# Patient Record
Sex: FEMALE | Race: WHITE
[De-identification: names, ages, dates, MRNs, and addresses within clinical notes are randomized per-mention and may not be internally consistent; named-entity substitution may affect disease eponyms.]

---

## 2019-11-17 ENCOUNTER — HOSPITAL ENCOUNTER (INPATIENT)
Dept: HOSPITAL 8 - ED | Age: 74
LOS: 1 days | Discharge: HOME | DRG: 309 | End: 2019-11-18
Attending: FAMILY MEDICINE | Admitting: INTERNAL MEDICINE
Payer: MEDICARE

## 2019-11-17 VITALS — WEIGHT: 241.85 LBS | HEIGHT: 60 IN | BODY MASS INDEX: 47.48 KG/M2

## 2019-11-17 VITALS — SYSTOLIC BLOOD PRESSURE: 115 MMHG | DIASTOLIC BLOOD PRESSURE: 89 MMHG

## 2019-11-17 DIAGNOSIS — Z82.49: ICD-10-CM

## 2019-11-17 DIAGNOSIS — R79.89: ICD-10-CM

## 2019-11-17 DIAGNOSIS — Z87.891: ICD-10-CM

## 2019-11-17 DIAGNOSIS — I24.8: ICD-10-CM

## 2019-11-17 DIAGNOSIS — I47.1: Primary | ICD-10-CM

## 2019-11-17 DIAGNOSIS — E66.01: ICD-10-CM

## 2019-11-17 DIAGNOSIS — I35.8: ICD-10-CM

## 2019-11-17 DIAGNOSIS — I95.9: ICD-10-CM

## 2019-11-17 DIAGNOSIS — Z90.710: ICD-10-CM

## 2019-11-17 LAB
ALBUMIN SERPL-MCNC: 3 G/DL (ref 3.4–5)
ALP SERPL-CCNC: 76 U/L (ref 45–117)
ALT SERPL-CCNC: 30 U/L (ref 12–78)
ANION GAP SERPL CALC-SCNC: 5 MMOL/L (ref 5–15)
BASOPHILS # BLD AUTO: 0.02 X10^3/UL (ref 0–0.1)
BASOPHILS NFR BLD AUTO: 0 % (ref 0–1)
BILIRUB SERPL-MCNC: 0.2 MG/DL (ref 0.2–1)
CALCIUM SERPL-MCNC: 8.4 MG/DL (ref 8.5–10.1)
CHLORIDE SERPL-SCNC: 111 MMOL/L (ref 98–107)
CREAT SERPL-MCNC: 0.84 MG/DL (ref 0.55–1.02)
EOSINOPHIL # BLD AUTO: 0.08 X10^3/UL (ref 0–0.4)
EOSINOPHIL NFR BLD AUTO: 1 % (ref 1–7)
ERYTHROCYTE [DISTWIDTH] IN BLOOD BY AUTOMATED COUNT: 14.1 % (ref 9.6–15.2)
INR PPP: 1.04 (ref 0.93–1.1)
LYMPHOCYTES # BLD AUTO: 1.18 X10^3/UL (ref 1–3.4)
LYMPHOCYTES NFR BLD AUTO: 16 % (ref 22–44)
MCH RBC QN AUTO: 30.4 PG (ref 27–34.8)
MCHC RBC AUTO-ENTMCNC: 32.7 G/DL (ref 32.4–35.8)
MCV RBC AUTO: 93.1 FL (ref 80–100)
MD: NO
MONOCYTES # BLD AUTO: 0.5 X10^3/UL (ref 0.2–0.8)
MONOCYTES NFR BLD AUTO: 7 % (ref 2–9)
NEUTROPHILS # BLD AUTO: 5.83 X10^3/UL (ref 1.8–6.8)
NEUTROPHILS NFR BLD AUTO: 77 % (ref 42–75)
PLATELET # BLD AUTO: 260 X10^3/UL (ref 130–400)
PMV BLD AUTO: 7.9 FL (ref 7.4–10.4)
PROT SERPL-MCNC: 7 G/DL (ref 6.4–8.2)
PROTHROMBIN TIME: 10.9 SECONDS (ref 9.6–11.5)
RBC # BLD AUTO: 4.95 X10^6/UL (ref 3.82–5.3)
T4 FREE SERPL-MCNC: 0.76 NG/DL (ref 0.76–1.46)
TROPONIN I SERPL-MCNC: 0.06 NG/ML (ref 0–0.04)

## 2019-11-17 PROCEDURE — 80048 BASIC METABOLIC PNL TOTAL CA: CPT

## 2019-11-17 PROCEDURE — 83605 ASSAY OF LACTIC ACID: CPT

## 2019-11-17 PROCEDURE — 96361 HYDRATE IV INFUSION ADD-ON: CPT

## 2019-11-17 PROCEDURE — 84484 ASSAY OF TROPONIN QUANT: CPT

## 2019-11-17 PROCEDURE — 83036 HEMOGLOBIN GLYCOSYLATED A1C: CPT

## 2019-11-17 PROCEDURE — 80053 COMPREHEN METABOLIC PANEL: CPT

## 2019-11-17 PROCEDURE — 83880 ASSAY OF NATRIURETIC PEPTIDE: CPT

## 2019-11-17 PROCEDURE — 83735 ASSAY OF MAGNESIUM: CPT

## 2019-11-17 PROCEDURE — 36415 COLL VENOUS BLD VENIPUNCTURE: CPT

## 2019-11-17 PROCEDURE — 85610 PROTHROMBIN TIME: CPT

## 2019-11-17 PROCEDURE — 96374 THER/PROPH/DIAG INJ IV PUSH: CPT

## 2019-11-17 PROCEDURE — 93005 ELECTROCARDIOGRAM TRACING: CPT

## 2019-11-17 PROCEDURE — 84443 ASSAY THYROID STIM HORMONE: CPT

## 2019-11-17 PROCEDURE — 93306 TTE W/DOPPLER COMPLETE: CPT

## 2019-11-17 PROCEDURE — 85025 COMPLETE CBC W/AUTO DIFF WBC: CPT

## 2019-11-17 PROCEDURE — 99291 CRITICAL CARE FIRST HOUR: CPT

## 2019-11-17 PROCEDURE — 84439 ASSAY OF FREE THYROXINE: CPT

## 2019-11-17 PROCEDURE — 84100 ASSAY OF PHOSPHORUS: CPT

## 2019-11-17 PROCEDURE — 71045 X-RAY EXAM CHEST 1 VIEW: CPT

## 2019-11-17 NOTE — NUR
BIB [facility] WITH C/O INTERMITTENT LIGHTHEADED AND DIZZINESS STARTING THIS 
AFTERNOON. EN ROUT PT RECEIVED 325 CHEWED ASA, VERSED, ZOFRAN, ADENOSING X2. 
UNSUCESSFUL CONVERSION FROM SVT WITH ADENOSINE NOR CARDIOVERSION. PT PRESENTED 
TO ED WITH SYMPTOMATIC SVT C/O LIGHTHEADEDNESS WITH ASSOCIATED CHEST PAIN.

## 2019-11-18 VITALS — SYSTOLIC BLOOD PRESSURE: 154 MMHG | DIASTOLIC BLOOD PRESSURE: 71 MMHG

## 2019-11-18 VITALS — SYSTOLIC BLOOD PRESSURE: 149 MMHG | DIASTOLIC BLOOD PRESSURE: 77 MMHG

## 2019-11-18 VITALS — DIASTOLIC BLOOD PRESSURE: 73 MMHG | SYSTOLIC BLOOD PRESSURE: 149 MMHG

## 2019-11-18 LAB
ANION GAP SERPL CALC-SCNC: 3 MMOL/L (ref 5–15)
BASOPHILS # BLD AUTO: 0.11 X10^3/UL (ref 0–0.1)
BASOPHILS NFR BLD AUTO: 1 % (ref 0–1)
CALCIUM SERPL-MCNC: 9.1 MG/DL (ref 8.5–10.1)
CHLORIDE SERPL-SCNC: 112 MMOL/L (ref 98–107)
CREAT SERPL-MCNC: 0.85 MG/DL (ref 0.55–1.02)
EOSINOPHIL # BLD AUTO: 0.25 X10^3/UL (ref 0–0.4)
EOSINOPHIL NFR BLD AUTO: 3 % (ref 1–7)
ERYTHROCYTE [DISTWIDTH] IN BLOOD BY AUTOMATED COUNT: 14.3 % (ref 9.6–15.2)
EST. AVERAGE GLUCOSE BLD GHB EST-MCNC: 111 MG/DL (ref 0–126)
HBA1C MFR BLD: 5.5 % (ref 4.2–6.3)
LYMPHOCYTES # BLD AUTO: 2.51 X10^3/UL (ref 1–3.4)
LYMPHOCYTES NFR BLD AUTO: 29 % (ref 22–44)
MCH RBC QN AUTO: 30.3 PG (ref 27–34.8)
MCHC RBC AUTO-ENTMCNC: 32.3 G/DL (ref 32.4–35.8)
MCV RBC AUTO: 93.7 FL (ref 80–100)
MD: NO
MONOCYTES # BLD AUTO: 0.73 X10^3/UL (ref 0.2–0.8)
MONOCYTES NFR BLD AUTO: 8 % (ref 2–9)
NEUTROPHILS # BLD AUTO: 5.19 X10^3/UL (ref 1.8–6.8)
NEUTROPHILS NFR BLD AUTO: 59 % (ref 42–75)
PLATELET # BLD AUTO: 270 X10^3/UL (ref 130–400)
PMV BLD AUTO: 8.4 FL (ref 7.4–10.4)
RBC # BLD AUTO: 4.94 X10^6/UL (ref 3.82–5.3)
TROPONIN I SERPL-MCNC: 0.33 NG/ML (ref 0–0.04)
TROPONIN I SERPL-MCNC: 0.37 NG/ML (ref 0–0.04)

## 2019-11-18 RX ADMIN — HEPARIN SODIUM SCH UNITS: 5000 INJECTION, SOLUTION INTRAVENOUS; SUBCUTANEOUS at 16:00

## 2019-11-18 RX ADMIN — HEPARIN SODIUM SCH UNITS: 5000 INJECTION, SOLUTION INTRAVENOUS; SUBCUTANEOUS at 09:04

## 2019-11-18 RX ADMIN — HEPARIN SODIUM SCH UNITS: 5000 INJECTION, SOLUTION INTRAVENOUS; SUBCUTANEOUS at 00:05

## 2019-11-19 ENCOUNTER — HOSPITAL ENCOUNTER (INPATIENT)
Dept: HOSPITAL 8 - ED | Age: 74
LOS: 4 days | Discharge: HOME | DRG: 287 | End: 2019-11-23
Attending: INTERNAL MEDICINE | Admitting: INTERNAL MEDICINE
Payer: MEDICARE

## 2019-11-19 VITALS — SYSTOLIC BLOOD PRESSURE: 119 MMHG | DIASTOLIC BLOOD PRESSURE: 78 MMHG

## 2019-11-19 VITALS — BODY MASS INDEX: 45.88 KG/M2 | HEIGHT: 60 IN | WEIGHT: 233.69 LBS

## 2019-11-19 VITALS — DIASTOLIC BLOOD PRESSURE: 78 MMHG | SYSTOLIC BLOOD PRESSURE: 119 MMHG

## 2019-11-19 DIAGNOSIS — Z82.49: ICD-10-CM

## 2019-11-19 DIAGNOSIS — I48.0: ICD-10-CM

## 2019-11-19 DIAGNOSIS — E66.01: ICD-10-CM

## 2019-11-19 DIAGNOSIS — I08.3: ICD-10-CM

## 2019-11-19 DIAGNOSIS — M19.90: ICD-10-CM

## 2019-11-19 DIAGNOSIS — Z90.710: ICD-10-CM

## 2019-11-19 DIAGNOSIS — D68.69: ICD-10-CM

## 2019-11-19 DIAGNOSIS — Z87.891: ICD-10-CM

## 2019-11-19 DIAGNOSIS — I25.82: ICD-10-CM

## 2019-11-19 DIAGNOSIS — I25.10: ICD-10-CM

## 2019-11-19 DIAGNOSIS — I47.1: ICD-10-CM

## 2019-11-19 DIAGNOSIS — I49.3: ICD-10-CM

## 2019-11-19 DIAGNOSIS — L03.115: ICD-10-CM

## 2019-11-19 DIAGNOSIS — E78.5: ICD-10-CM

## 2019-11-19 DIAGNOSIS — Z66: ICD-10-CM

## 2019-11-19 DIAGNOSIS — Z90.49: ICD-10-CM

## 2019-11-19 DIAGNOSIS — I11.9: ICD-10-CM

## 2019-11-19 DIAGNOSIS — I47.2: Primary | ICD-10-CM

## 2019-11-19 LAB
ALBUMIN SERPL-MCNC: 3.3 G/DL (ref 3.4–5)
ALP SERPL-CCNC: 80 U/L (ref 45–117)
ALT SERPL-CCNC: 32 U/L (ref 12–78)
ANION GAP SERPL CALC-SCNC: 7 MMOL/L (ref 5–15)
BASOPHILS # BLD AUTO: 0.03 X10^3/UL (ref 0–0.1)
BASOPHILS NFR BLD AUTO: 0 % (ref 0–1)
BILIRUB SERPL-MCNC: 0.3 MG/DL (ref 0.2–1)
CALCIUM SERPL-MCNC: 9.4 MG/DL (ref 8.5–10.1)
CHLORIDE SERPL-SCNC: 109 MMOL/L (ref 98–107)
CREAT SERPL-MCNC: 1.45 MG/DL (ref 0.55–1.02)
EOSINOPHIL # BLD AUTO: 0.16 X10^3/UL (ref 0–0.4)
EOSINOPHIL NFR BLD AUTO: 2 % (ref 1–7)
ERYTHROCYTE [DISTWIDTH] IN BLOOD BY AUTOMATED COUNT: 13.9 % (ref 9.6–15.2)
LYMPHOCYTES # BLD AUTO: 2.2 X10^3/UL (ref 1–3.4)
LYMPHOCYTES NFR BLD AUTO: 24 % (ref 22–44)
MCH RBC QN AUTO: 30.5 PG (ref 27–34.8)
MCHC RBC AUTO-ENTMCNC: 33.5 G/DL (ref 32.4–35.8)
MCV RBC AUTO: 91.1 FL (ref 80–100)
MD: NO
MONOCYTES # BLD AUTO: 0.73 X10^3/UL (ref 0.2–0.8)
MONOCYTES NFR BLD AUTO: 8 % (ref 2–9)
NEUTROPHILS # BLD AUTO: 6.23 X10^3/UL (ref 1.8–6.8)
NEUTROPHILS NFR BLD AUTO: 67 % (ref 42–75)
PLATELET # BLD AUTO: 270 X10^3/UL (ref 130–400)
PMV BLD AUTO: 7.9 FL (ref 7.4–10.4)
PROT SERPL-MCNC: 7.6 G/DL (ref 6.4–8.2)
RBC # BLD AUTO: 5.06 X10^6/UL (ref 3.82–5.3)
TROPONIN I SERPL-MCNC: 0.03 NG/ML (ref 0–0.04)

## 2019-11-19 PROCEDURE — C1769 GUIDE WIRE: HCPCS

## 2019-11-19 PROCEDURE — 80053 COMPREHEN METABOLIC PANEL: CPT

## 2019-11-19 PROCEDURE — 85025 COMPLETE CBC W/AUTO DIFF WBC: CPT

## 2019-11-19 PROCEDURE — 99156 MOD SED OTH PHYS/QHP 5/>YRS: CPT

## 2019-11-19 PROCEDURE — 87040 BLOOD CULTURE FOR BACTERIA: CPT

## 2019-11-19 PROCEDURE — 84100 ASSAY OF PHOSPHORUS: CPT

## 2019-11-19 PROCEDURE — C1894 INTRO/SHEATH, NON-LASER: HCPCS

## 2019-11-19 PROCEDURE — 93005 ELECTROCARDIOGRAM TRACING: CPT

## 2019-11-19 PROCEDURE — 83735 ASSAY OF MAGNESIUM: CPT

## 2019-11-19 PROCEDURE — C1887 CATHETER, GUIDING: HCPCS

## 2019-11-19 PROCEDURE — 99157 MOD SED OTHER PHYS/QHP EA: CPT

## 2019-11-19 PROCEDURE — 99291 CRITICAL CARE FIRST HOUR: CPT

## 2019-11-19 PROCEDURE — 36415 COLL VENOUS BLD VENIPUNCTURE: CPT

## 2019-11-19 PROCEDURE — 71045 X-RAY EXAM CHEST 1 VIEW: CPT

## 2019-11-19 PROCEDURE — 80048 BASIC METABOLIC PNL TOTAL CA: CPT

## 2019-11-19 PROCEDURE — 93571 IV DOP VEL&/PRESS C FLO 1ST: CPT

## 2019-11-19 PROCEDURE — 80061 LIPID PANEL: CPT

## 2019-11-19 PROCEDURE — 93458 L HRT ARTERY/VENTRICLE ANGIO: CPT

## 2019-11-19 PROCEDURE — 84484 ASSAY OF TROPONIN QUANT: CPT

## 2019-11-19 RX ADMIN — SODIUM CHLORIDE, PRESERVATIVE FREE SCH ML: 5 INJECTION INTRAVENOUS at 23:05

## 2019-11-19 RX ADMIN — Medication PRN EACH: at 20:30

## 2019-11-19 RX ADMIN — HEPARIN SODIUM SCH UNITS: 5000 INJECTION, SOLUTION INTRAVENOUS; SUBCUTANEOUS at 23:05

## 2019-11-19 NOTE — NUR
CALLED PHARMACY AND REQUESTED AMIO DRIP.  PT. CONTINUES TO HAVE 6 SECOND RUNS 
OF V-TACH INTERMITTENTLY.  PT. C/O "EXTREME DIZZINESS AND BURNING CP" DURING 
THESE EVENTS.  EKG'S BEING DONE TO CAPTURE EVENTS.  PADS HAVE BEEN IN PLACE 
SINCE GETTING INTO ROOM.  O2 PLACED FOR RA SAT OF 89%.  DR. HUTTON IN TO 
DISCUSS POC WITH PT.

## 2019-11-19 NOTE — NUR
PT. DENIES ANY SOB, DIZZINESS, OR OTHER COMPLAINTS DURING THESE EVENTS.  ALL 
MONITORS ARE IN PLACE.  LABS DONE, 2 IV'S IN PLACE.  X-RAY DONE.  TECH AT  
FOR REPEATE EKG.  EKG WAS DONE IN [facility] IMMEDIATELY ON ARRIVAL.  PT. REPORTS 
TAKING 324 CHEWABLE ASPIRIN PRIOR TO COMING IN TONIGHT.

## 2019-11-19 NOTE — NUR
2ND LOADING DOSE INFUSING NOW PER ORDER.  TECH AT  FOR EKG.  PT. REPORTS HAS 
BEEN FEELIGN MUCH BETTER SINCE ADMIN OF FIRST LOADING DOSE.  PT. DENIES ANY 
PAIN/DISCOMFORT AT THIS TIME.  ALL MONTIORS AND SAFETY MEASURES REMAIN IN 
PLACE.

## 2019-11-19 NOTE — NUR
DR. HUTTON AT BS AT THIS TIME.  PT. REMAINS IN SVT ON MONITOR RATE 200'S TO 
220'S.  AWATING MED FROM PHARMACY.

## 2019-11-19 NOTE — NUR
RUFINO LEE STARTED PER MAR.  PT. REMAINS WITH 'S 'S AND CONTINUES TO 
C/O DIZZINESS AND PAIN.  DR. HUTTON CONSULTING WITH CARDS NOW.

## 2019-11-19 NOTE — NUR
PT. TO ED [facility] WITH C/O SUDDEN ONSET STERNAL CP DESCRIBED AS BURING 6/10 
WHILE SITTING DOWN.  PT. DENIES CP AT THIS TIME AND STATES "IT ONLY COMES ON 
WHEN MY HEART IS RACING"  PT. HAD A RECENT EVENT OF SVT AND REQUIRED ADENOSINE 
AND CARDIOVERSION.  PT. DENIES ANY DAILY MEDICATIONS.  UPON ENTERING ROOM PT. 
HAD FREQUENT RUNS OF V-TACH AND RATE 'S.  DURING THIS NOTE PT. HAD ANOTER 
EVENT OF HR IN 'S WIDE COMPLEX AND STATES "HERE'S THAT PAIN AGAIN".  
LASTED ABOUT 6 SECONDS AND WENT AWAY; HR BACK DOWN  AT THIS TIME.

## 2019-11-19 NOTE — NUR
PT. REPORTS PAIN IS NOW GONE.  HR NOW 90'S AND APPEARS NSR ON MONITOR.  DR. HUTTON DISCUSSING POC WITH PT. NOW.  PER CARDIOLOGY IN 10 MIN WE WILL GIVE 
ANOTHER LOADING DOSE OF 150ML'S OVER 10 MIN AND THEN START THE AMIO DRIP.  

-------------------------------------------------------------------------------

Addendum: 11/19/19 at 2107 by LEONA

-------------------------------------------------------------------------------

150MG'S FOR LOADING DOSE; NOT 150ML'S.

## 2019-11-20 VITALS — DIASTOLIC BLOOD PRESSURE: 77 MMHG | SYSTOLIC BLOOD PRESSURE: 127 MMHG

## 2019-11-20 VITALS — DIASTOLIC BLOOD PRESSURE: 74 MMHG | SYSTOLIC BLOOD PRESSURE: 129 MMHG

## 2019-11-20 VITALS — SYSTOLIC BLOOD PRESSURE: 138 MMHG | DIASTOLIC BLOOD PRESSURE: 87 MMHG

## 2019-11-20 VITALS — DIASTOLIC BLOOD PRESSURE: 78 MMHG | SYSTOLIC BLOOD PRESSURE: 145 MMHG

## 2019-11-20 VITALS — SYSTOLIC BLOOD PRESSURE: 160 MMHG | DIASTOLIC BLOOD PRESSURE: 90 MMHG

## 2019-11-20 LAB
ALBUMIN SERPL-MCNC: 3.3 G/DL (ref 3.4–5)
ALP SERPL-CCNC: 75 U/L (ref 45–117)
ALT SERPL-CCNC: 27 U/L (ref 12–78)
ANION GAP SERPL CALC-SCNC: 5 MMOL/L (ref 5–15)
BASOPHILS # BLD AUTO: 0.06 X10^3/UL (ref 0–0.1)
BASOPHILS NFR BLD AUTO: 1 % (ref 0–1)
BILIRUB SERPL-MCNC: 0.4 MG/DL (ref 0.2–1)
CALCIUM SERPL-MCNC: 9.2 MG/DL (ref 8.5–10.1)
CHLORIDE SERPL-SCNC: 109 MMOL/L (ref 98–107)
CREAT SERPL-MCNC: 0.99 MG/DL (ref 0.55–1.02)
EOSINOPHIL # BLD AUTO: 0.24 X10^3/UL (ref 0–0.4)
EOSINOPHIL NFR BLD AUTO: 3 % (ref 1–7)
ERYTHROCYTE [DISTWIDTH] IN BLOOD BY AUTOMATED COUNT: 13.8 % (ref 9.6–15.2)
LYMPHOCYTES # BLD AUTO: 2.67 X10^3/UL (ref 1–3.4)
LYMPHOCYTES NFR BLD AUTO: 30 % (ref 22–44)
MCH RBC QN AUTO: 30.9 PG (ref 27–34.8)
MCHC RBC AUTO-ENTMCNC: 33.6 G/DL (ref 32.4–35.8)
MCV RBC AUTO: 91.8 FL (ref 80–100)
MD: NO
MONOCYTES # BLD AUTO: 0.75 X10^3/UL (ref 0.2–0.8)
MONOCYTES NFR BLD AUTO: 8 % (ref 2–9)
NEUTROPHILS # BLD AUTO: 5.17 X10^3/UL (ref 1.8–6.8)
NEUTROPHILS NFR BLD AUTO: 58 % (ref 42–75)
PLATELET # BLD AUTO: 266 X10^3/UL (ref 130–400)
PMV BLD AUTO: 8.1 FL (ref 7.4–10.4)
PROT SERPL-MCNC: 7.2 G/DL (ref 6.4–8.2)
RBC # BLD AUTO: 4.83 X10^6/UL (ref 3.82–5.3)
TROPONIN I SERPL-MCNC: 0.08 NG/ML (ref 0–0.04)
TROPONIN I SERPL-MCNC: 0.18 NG/ML (ref 0–0.04)

## 2019-11-20 RX ADMIN — HEPARIN SODIUM SCH UNITS: 5000 INJECTION, SOLUTION INTRAVENOUS; SUBCUTANEOUS at 06:06

## 2019-11-20 RX ADMIN — SODIUM CHLORIDE, PRESERVATIVE FREE SCH ML: 5 INJECTION INTRAVENOUS at 21:00

## 2019-11-20 RX ADMIN — SODIUM CHLORIDE, PRESERVATIVE FREE SCH ML: 5 INJECTION INTRAVENOUS at 09:42

## 2019-11-20 RX ADMIN — Medication PRN EACH: at 19:45

## 2019-11-20 RX ADMIN — HEPARIN SODIUM SCH UNITS: 5000 INJECTION, SOLUTION INTRAVENOUS; SUBCUTANEOUS at 22:16

## 2019-11-20 RX ADMIN — DOCUSATE SODIUM 50MG AND SENNOSIDES 8.6MG SCH TAB: 8.6; 5 TABLET, FILM COATED ORAL at 09:00

## 2019-11-20 RX ADMIN — HEPARIN SODIUM SCH UNITS: 5000 INJECTION, SOLUTION INTRAVENOUS; SUBCUTANEOUS at 13:33

## 2019-11-21 VITALS — DIASTOLIC BLOOD PRESSURE: 85 MMHG | SYSTOLIC BLOOD PRESSURE: 181 MMHG

## 2019-11-21 VITALS — DIASTOLIC BLOOD PRESSURE: 75 MMHG | SYSTOLIC BLOOD PRESSURE: 130 MMHG

## 2019-11-21 VITALS — DIASTOLIC BLOOD PRESSURE: 99 MMHG | SYSTOLIC BLOOD PRESSURE: 155 MMHG

## 2019-11-21 VITALS — SYSTOLIC BLOOD PRESSURE: 145 MMHG | DIASTOLIC BLOOD PRESSURE: 84 MMHG

## 2019-11-21 VITALS — SYSTOLIC BLOOD PRESSURE: 167 MMHG | DIASTOLIC BLOOD PRESSURE: 83 MMHG

## 2019-11-21 LAB
CHOL/HDL RATIO: 4.7
HDL CHOL %: 21 % (ref 28–40)
HDL CHOLESTEROL (DIRECT): 38 MG/DL (ref 40–60)
LDL CHOLESTEROL,CALCULATED: 114 MG/DL (ref 54–169)
LDLC/HDLC SERPL: 3 {RATIO} (ref 0.5–3)
TRIGL SERPL-MCNC: 131 MG/DL (ref 50–200)
VLDLC SERPL CALC-MCNC: 26 MG/DL (ref 0–25)

## 2019-11-21 PROCEDURE — B2151ZZ FLUOROSCOPY OF LEFT HEART USING LOW OSMOLAR CONTRAST: ICD-10-PCS | Performed by: INTERNAL MEDICINE

## 2019-11-21 PROCEDURE — B2111ZZ FLUOROSCOPY OF MULTIPLE CORONARY ARTERIES USING LOW OSMOLAR CONTRAST: ICD-10-PCS | Performed by: INTERNAL MEDICINE

## 2019-11-21 PROCEDURE — 4A023N7 MEASUREMENT OF CARDIAC SAMPLING AND PRESSURE, LEFT HEART, PERCUTANEOUS APPROACH: ICD-10-PCS | Performed by: INTERNAL MEDICINE

## 2019-11-21 PROCEDURE — 4A033BC MEASUREMENT OF ARTERIAL PRESSURE, CORONARY, PERCUTANEOUS APPROACH: ICD-10-PCS | Performed by: INTERNAL MEDICINE

## 2019-11-21 RX ADMIN — HEPARIN SODIUM SCH UNITS: 5000 INJECTION, SOLUTION INTRAVENOUS; SUBCUTANEOUS at 22:00

## 2019-11-21 RX ADMIN — HEPARIN SODIUM SCH UNITS: 5000 INJECTION, SOLUTION INTRAVENOUS; SUBCUTANEOUS at 14:00

## 2019-11-21 RX ADMIN — SODIUM CHLORIDE, PRESERVATIVE FREE SCH ML: 5 INJECTION INTRAVENOUS at 21:00

## 2019-11-21 RX ADMIN — HEPARIN SODIUM SCH UNITS: 5000 INJECTION, SOLUTION INTRAVENOUS; SUBCUTANEOUS at 05:53

## 2019-11-21 RX ADMIN — DOCUSATE SODIUM 50MG AND SENNOSIDES 8.6MG SCH TAB: 8.6; 5 TABLET, FILM COATED ORAL at 07:51

## 2019-11-21 RX ADMIN — CEFTRIAXONE SCH MLS/HR: 1 INJECTION, SOLUTION INTRAVENOUS at 09:50

## 2019-11-21 RX ADMIN — ATORVASTATIN CALCIUM SCH MG: 20 TABLET, FILM COATED ORAL at 21:00

## 2019-11-21 RX ADMIN — SODIUM CHLORIDE, PRESERVATIVE FREE SCH ML: 5 INJECTION INTRAVENOUS at 07:52

## 2019-11-22 VITALS — SYSTOLIC BLOOD PRESSURE: 123 MMHG | DIASTOLIC BLOOD PRESSURE: 73 MMHG

## 2019-11-22 VITALS — SYSTOLIC BLOOD PRESSURE: 135 MMHG | DIASTOLIC BLOOD PRESSURE: 68 MMHG

## 2019-11-22 VITALS — SYSTOLIC BLOOD PRESSURE: 143 MMHG | DIASTOLIC BLOOD PRESSURE: 75 MMHG

## 2019-11-22 VITALS — SYSTOLIC BLOOD PRESSURE: 116 MMHG | DIASTOLIC BLOOD PRESSURE: 70 MMHG

## 2019-11-22 VITALS — SYSTOLIC BLOOD PRESSURE: 118 MMHG | DIASTOLIC BLOOD PRESSURE: 69 MMHG

## 2019-11-22 VITALS — DIASTOLIC BLOOD PRESSURE: 69 MMHG | SYSTOLIC BLOOD PRESSURE: 118 MMHG

## 2019-11-22 LAB
ANION GAP SERPL CALC-SCNC: 4 MMOL/L (ref 5–15)
CALCIUM SERPL-MCNC: 9 MG/DL (ref 8.5–10.1)
CHLORIDE SERPL-SCNC: 110 MMOL/L (ref 98–107)
CREAT SERPL-MCNC: 0.8 MG/DL (ref 0.55–1.02)

## 2019-11-22 RX ADMIN — SODIUM CHLORIDE, PRESERVATIVE FREE SCH ML: 5 INJECTION INTRAVENOUS at 22:11

## 2019-11-22 RX ADMIN — AMIODARONE HYDROCHLORIDE SCH MG: 200 TABLET ORAL at 11:11

## 2019-11-22 RX ADMIN — CEFTRIAXONE SCH MLS/HR: 1 INJECTION, SOLUTION INTRAVENOUS at 08:41

## 2019-11-22 RX ADMIN — SODIUM CHLORIDE, PRESERVATIVE FREE SCH ML: 5 INJECTION INTRAVENOUS at 08:41

## 2019-11-22 RX ADMIN — AMIODARONE HYDROCHLORIDE SCH MG: 200 TABLET ORAL at 22:11

## 2019-11-22 RX ADMIN — HEPARIN SODIUM SCH UNITS: 5000 INJECTION, SOLUTION INTRAVENOUS; SUBCUTANEOUS at 17:00

## 2019-11-22 RX ADMIN — HEPARIN SODIUM SCH UNITS: 5000 INJECTION, SOLUTION INTRAVENOUS; SUBCUTANEOUS at 06:02

## 2019-11-22 RX ADMIN — DOCUSATE SODIUM 50MG AND SENNOSIDES 8.6MG SCH TAB: 8.6; 5 TABLET, FILM COATED ORAL at 08:41

## 2019-11-22 RX ADMIN — ATORVASTATIN CALCIUM SCH MG: 20 TABLET, FILM COATED ORAL at 22:11

## 2019-11-22 RX ADMIN — HEPARIN SODIUM SCH UNITS: 5000 INJECTION, SOLUTION INTRAVENOUS; SUBCUTANEOUS at 22:11

## 2019-11-23 VITALS — DIASTOLIC BLOOD PRESSURE: 76 MMHG | SYSTOLIC BLOOD PRESSURE: 137 MMHG

## 2019-11-23 VITALS — SYSTOLIC BLOOD PRESSURE: 135 MMHG | DIASTOLIC BLOOD PRESSURE: 77 MMHG

## 2019-11-23 RX ADMIN — DOCUSATE SODIUM 50MG AND SENNOSIDES 8.6MG SCH TAB: 8.6; 5 TABLET, FILM COATED ORAL at 08:38

## 2019-11-23 RX ADMIN — SODIUM CHLORIDE, PRESERVATIVE FREE SCH ML: 5 INJECTION INTRAVENOUS at 08:50

## 2019-11-23 RX ADMIN — AMIODARONE HYDROCHLORIDE SCH MG: 200 TABLET ORAL at 08:39

## 2019-11-23 RX ADMIN — HEPARIN SODIUM SCH UNITS: 5000 INJECTION, SOLUTION INTRAVENOUS; SUBCUTANEOUS at 06:43

## 2019-11-23 RX ADMIN — CEFTRIAXONE SCH MLS/HR: 1 INJECTION, SOLUTION INTRAVENOUS at 08:39

## 2020-01-25 ENCOUNTER — HOSPITAL ENCOUNTER (OUTPATIENT)
Dept: HOSPITAL 8 - ED | Age: 75
Setting detail: OBSERVATION
LOS: 4 days | Discharge: HOME | End: 2020-01-29
Attending: EMERGENCY MEDICINE | Admitting: EMERGENCY MEDICINE
Payer: MEDICARE

## 2020-01-25 VITALS — BODY MASS INDEX: 48.26 KG/M2 | WEIGHT: 245.82 LBS | HEIGHT: 60 IN
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
## 2020-01-25 DIAGNOSIS — M19.019: ICD-10-CM

## 2020-01-25 DIAGNOSIS — I47.1: ICD-10-CM

## 2020-01-25 DIAGNOSIS — Z79.01: ICD-10-CM
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
## 2020-01-25 DIAGNOSIS — S42.201A: Primary | ICD-10-CM

## 2020-01-25 DIAGNOSIS — R11.2: ICD-10-CM
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
## 2020-01-25 DIAGNOSIS — S00.03XA: ICD-10-CM

## 2020-01-25 DIAGNOSIS — Z87.891: ICD-10-CM

## 2020-01-25 DIAGNOSIS — I48.91: ICD-10-CM

## 2020-01-25 PROCEDURE — G0378 HOSPITAL OBSERVATION PER HR: HCPCS

## 2020-01-25 PROCEDURE — 97166 OT EVAL MOD COMPLEX 45 MIN: CPT

## 2020-01-25 PROCEDURE — 93005 ELECTROCARDIOGRAM TRACING: CPT

## 2020-01-25 PROCEDURE — 36415 COLL VENOUS BLD VENIPUNCTURE: CPT

## 2020-01-25 PROCEDURE — 85025 COMPLETE CBC W/AUTO DIFF WBC: CPT

## 2020-01-25 PROCEDURE — 97163 PT EVAL HIGH COMPLEX 45 MIN: CPT

## 2020-01-25 PROCEDURE — 99284 EMERGENCY DEPT VISIT MOD MDM: CPT

## 2020-01-25 PROCEDURE — 70450 CT HEAD/BRAIN W/O DYE: CPT

## 2020-01-25 PROCEDURE — 80053 COMPREHEN METABOLIC PANEL: CPT

## 2020-01-25 PROCEDURE — 96374 THER/PROPH/DIAG INJ IV PUSH: CPT

## 2020-01-25 PROCEDURE — 73030 X-RAY EXAM OF SHOULDER: CPT

## 2020-01-25 PROCEDURE — 80048 BASIC METABOLIC PNL TOTAL CA: CPT

## 2020-01-25 PROCEDURE — 96361 HYDRATE IV INFUSION ADD-ON: CPT

## 2020-01-25 PROCEDURE — 96372 THER/PROPH/DIAG INJ SC/IM: CPT

## 2020-01-25 NOTE — NUR
BIB [facility], GLF, PAIN AND LIMITED ROM IN THE RIGHT SHOULDER. HIT HER HEAD WHEN 
SHE FELL, PAIN IN THE BACK OF HEAD AT SITE OF IMPACT. TAKES BLOOD THINNER. 
DENIES HEADACHE, N/V. NO LOC. ER MD LAW IN TO ASSESS PT.

## 2020-01-26 VITALS — SYSTOLIC BLOOD PRESSURE: 127 MMHG | DIASTOLIC BLOOD PRESSURE: 78 MMHG

## 2020-01-26 VITALS — DIASTOLIC BLOOD PRESSURE: 74 MMHG | SYSTOLIC BLOOD PRESSURE: 136 MMHG

## 2020-01-26 VITALS — SYSTOLIC BLOOD PRESSURE: 133 MMHG | DIASTOLIC BLOOD PRESSURE: 77 MMHG

## 2020-01-26 VITALS — SYSTOLIC BLOOD PRESSURE: 136 MMHG | DIASTOLIC BLOOD PRESSURE: 78 MMHG

## 2020-01-26 LAB
ALBUMIN SERPL-MCNC: 3.5 G/DL (ref 3.4–5)
ALP SERPL-CCNC: 81 U/L (ref 45–117)
ALT SERPL-CCNC: 23 U/L (ref 12–78)
ANION GAP SERPL CALC-SCNC: 9 MMOL/L (ref 5–15)
BASOPHILS # BLD AUTO: 0.03 X10^3/UL (ref 0–0.1)
BASOPHILS NFR BLD AUTO: 0 % (ref 0–1)
BILIRUB SERPL-MCNC: 0.4 MG/DL (ref 0.2–1)
CALCIUM SERPL-MCNC: 9.2 MG/DL (ref 8.5–10.1)
CHLORIDE SERPL-SCNC: 106 MMOL/L (ref 98–107)
CREAT SERPL-MCNC: 0.8 MG/DL (ref 0.55–1.02)
EOSINOPHIL # BLD AUTO: 0.02 X10^3/UL (ref 0–0.4)
EOSINOPHIL NFR BLD AUTO: 0 % (ref 1–7)
ERYTHROCYTE [DISTWIDTH] IN BLOOD BY AUTOMATED COUNT: 14.6 % (ref 9.6–15.2)
LYMPHOCYTES # BLD AUTO: 1.63 X10^3/UL (ref 1–3.4)
LYMPHOCYTES NFR BLD AUTO: 13 % (ref 22–44)
MCH RBC QN AUTO: 30 PG (ref 27–34.8)
MCHC RBC AUTO-ENTMCNC: 33.1 G/DL (ref 32.4–35.8)
MCV RBC AUTO: 90.8 FL (ref 80–100)
MD: NO
MONOCYTES # BLD AUTO: 0.56 X10^3/UL (ref 0.2–0.8)
MONOCYTES NFR BLD AUTO: 4 % (ref 2–9)
NEUTROPHILS # BLD AUTO: 10.78 X10^3/UL (ref 1.8–6.8)
NEUTROPHILS NFR BLD AUTO: 83 % (ref 42–75)
PLATELET # BLD AUTO: 277 X10^3/UL (ref 130–400)
PMV BLD AUTO: 8.3 FL (ref 7.4–10.4)
PROT SERPL-MCNC: 8.1 G/DL (ref 6.4–8.2)
RBC # BLD AUTO: 4.97 X10^6/UL (ref 3.82–5.3)

## 2020-01-26 RX ADMIN — SODIUM CHLORIDE SCH MLS/HR: 0.9 INJECTION, SOLUTION INTRAVENOUS at 13:30

## 2020-01-26 RX ADMIN — RIVAROXABAN SCH MG: 20 TABLET, FILM COATED ORAL at 20:31

## 2020-01-26 RX ADMIN — AMIODARONE HYDROCHLORIDE SCH MG: 200 TABLET ORAL at 09:30

## 2020-01-26 RX ADMIN — INSULIN LISPRO SCH NOTE: 100 INJECTION, SOLUTION INTRAVENOUS; SUBCUTANEOUS at 07:30

## 2020-01-26 RX ADMIN — SODIUM CHLORIDE SCH MLS/HR: 0.9 INJECTION, SOLUTION INTRAVENOUS at 23:06

## 2020-01-26 RX ADMIN — INSULIN LISPRO SCH NOTE: 100 INJECTION, SOLUTION INTRAVENOUS; SUBCUTANEOUS at 07:50

## 2020-01-26 RX ADMIN — INSULIN LISPRO SCH NOTE: 100 INJECTION, SOLUTION INTRAVENOUS; SUBCUTANEOUS at 23:12

## 2020-01-26 RX ADMIN — AMIODARONE HYDROCHLORIDE SCH MG: 200 TABLET ORAL at 20:31

## 2020-01-26 RX ADMIN — ATORVASTATIN CALCIUM SCH MG: 20 TABLET, FILM COATED ORAL at 20:31

## 2020-01-26 NOTE — NUR
PT WITH NAUSEA, VOMITTING AND DIZZINESS. PT UNALBE TO AMBULATE SAFELY TO THE 
BATHROOM DUE TO DIZZINESS. ER MD QUIROZ IN TO ASSESS PT. SHE IS TO BE 
ADMITTED. ORDERS RECIEVED FOR IV ZOFRAN.

## 2020-01-27 VITALS — DIASTOLIC BLOOD PRESSURE: 69 MMHG | SYSTOLIC BLOOD PRESSURE: 125 MMHG

## 2020-01-27 VITALS — DIASTOLIC BLOOD PRESSURE: 65 MMHG | SYSTOLIC BLOOD PRESSURE: 127 MMHG

## 2020-01-27 VITALS — DIASTOLIC BLOOD PRESSURE: 67 MMHG | SYSTOLIC BLOOD PRESSURE: 124 MMHG

## 2020-01-27 VITALS — SYSTOLIC BLOOD PRESSURE: 121 MMHG | DIASTOLIC BLOOD PRESSURE: 71 MMHG

## 2020-01-27 LAB
ANION GAP SERPL CALC-SCNC: 5 MMOL/L (ref 5–15)
BASOPHILS # BLD AUTO: 0.06 X10^3/UL (ref 0–0.1)
BASOPHILS NFR BLD AUTO: 1 % (ref 0–1)
CALCIUM SERPL-MCNC: 8.6 MG/DL (ref 8.5–10.1)
CHLORIDE SERPL-SCNC: 110 MMOL/L (ref 98–107)
CREAT SERPL-MCNC: 0.89 MG/DL (ref 0.55–1.02)
EOSINOPHIL # BLD AUTO: 0.17 X10^3/UL (ref 0–0.4)
EOSINOPHIL NFR BLD AUTO: 2 % (ref 1–7)
ERYTHROCYTE [DISTWIDTH] IN BLOOD BY AUTOMATED COUNT: 14.7 % (ref 9.6–15.2)
LYMPHOCYTES # BLD AUTO: 1.86 X10^3/UL (ref 1–3.4)
LYMPHOCYTES NFR BLD AUTO: 23 % (ref 22–44)
MCH RBC QN AUTO: 30 PG (ref 27–34.8)
MCHC RBC AUTO-ENTMCNC: 32.8 G/DL (ref 32.4–35.8)
MCV RBC AUTO: 91.4 FL (ref 80–100)
MD: NO
MONOCYTES # BLD AUTO: 0.72 X10^3/UL (ref 0.2–0.8)
MONOCYTES NFR BLD AUTO: 9 % (ref 2–9)
NEUTROPHILS # BLD AUTO: 5.14 X10^3/UL (ref 1.8–6.8)
NEUTROPHILS NFR BLD AUTO: 65 % (ref 42–75)
PLATELET # BLD AUTO: 235 X10^3/UL (ref 130–400)
PMV BLD AUTO: 8.4 FL (ref 7.4–10.4)
RBC # BLD AUTO: 4.38 X10^6/UL (ref 3.82–5.3)

## 2020-01-27 RX ADMIN — INSULIN LISPRO SCH NOTE: 100 INJECTION, SOLUTION INTRAVENOUS; SUBCUTANEOUS at 15:30

## 2020-01-27 RX ADMIN — SODIUM CHLORIDE SCH MLS/HR: 0.9 INJECTION, SOLUTION INTRAVENOUS at 10:02

## 2020-01-27 RX ADMIN — AMIODARONE HYDROCHLORIDE SCH MG: 200 TABLET ORAL at 20:28

## 2020-01-27 RX ADMIN — ATORVASTATIN CALCIUM SCH MG: 20 TABLET, FILM COATED ORAL at 20:28

## 2020-01-27 RX ADMIN — INSULIN LISPRO SCH NOTE: 100 INJECTION, SOLUTION INTRAVENOUS; SUBCUTANEOUS at 07:30

## 2020-01-27 RX ADMIN — AMIODARONE HYDROCHLORIDE SCH MG: 200 TABLET ORAL at 10:01

## 2020-01-27 RX ADMIN — RIVAROXABAN SCH MG: 20 TABLET, FILM COATED ORAL at 18:07

## 2020-01-27 RX ADMIN — SODIUM CHLORIDE SCH MLS/HR: 0.9 INJECTION, SOLUTION INTRAVENOUS at 20:42

## 2020-01-27 RX ADMIN — INSULIN LISPRO SCH NOTE: 100 INJECTION, SOLUTION INTRAVENOUS; SUBCUTANEOUS at 23:30

## 2020-01-27 RX ADMIN — ACETAMINOPHEN PRN MG: 325 TABLET, FILM COATED ORAL at 23:52

## 2020-01-28 VITALS — DIASTOLIC BLOOD PRESSURE: 55 MMHG | SYSTOLIC BLOOD PRESSURE: 119 MMHG

## 2020-01-28 VITALS — SYSTOLIC BLOOD PRESSURE: 127 MMHG | DIASTOLIC BLOOD PRESSURE: 53 MMHG

## 2020-01-28 VITALS — DIASTOLIC BLOOD PRESSURE: 80 MMHG | SYSTOLIC BLOOD PRESSURE: 156 MMHG

## 2020-01-28 VITALS — SYSTOLIC BLOOD PRESSURE: 140 MMHG | DIASTOLIC BLOOD PRESSURE: 65 MMHG

## 2020-01-28 VITALS — SYSTOLIC BLOOD PRESSURE: 144 MMHG | DIASTOLIC BLOOD PRESSURE: 79 MMHG

## 2020-01-28 RX ADMIN — INSULIN LISPRO SCH NOTE: 100 INJECTION, SOLUTION INTRAVENOUS; SUBCUTANEOUS at 15:30

## 2020-01-28 RX ADMIN — AMIODARONE HYDROCHLORIDE SCH MG: 200 TABLET ORAL at 08:39

## 2020-01-28 RX ADMIN — SODIUM CHLORIDE SCH MLS/HR: 0.9 INJECTION, SOLUTION INTRAVENOUS at 06:04

## 2020-01-28 RX ADMIN — SODIUM CHLORIDE SCH MLS/HR: 0.9 INJECTION, SOLUTION INTRAVENOUS at 15:56

## 2020-01-28 RX ADMIN — ATORVASTATIN CALCIUM SCH MG: 20 TABLET, FILM COATED ORAL at 20:24

## 2020-01-28 RX ADMIN — INSULIN LISPRO SCH NOTE: 100 INJECTION, SOLUTION INTRAVENOUS; SUBCUTANEOUS at 07:26

## 2020-01-28 RX ADMIN — AMIODARONE HYDROCHLORIDE SCH MG: 200 TABLET ORAL at 20:24

## 2020-01-28 RX ADMIN — RIVAROXABAN SCH MG: 20 TABLET, FILM COATED ORAL at 18:23

## 2020-01-28 RX ADMIN — INSULIN LISPRO SCH NOTE: 100 INJECTION, SOLUTION INTRAVENOUS; SUBCUTANEOUS at 23:30

## 2020-01-28 RX ADMIN — ACETAMINOPHEN PRN MG: 325 TABLET, FILM COATED ORAL at 13:36

## 2020-01-29 VITALS — DIASTOLIC BLOOD PRESSURE: 68 MMHG | SYSTOLIC BLOOD PRESSURE: 117 MMHG

## 2020-01-29 VITALS — DIASTOLIC BLOOD PRESSURE: 67 MMHG | SYSTOLIC BLOOD PRESSURE: 115 MMHG

## 2020-01-29 RX ADMIN — SODIUM CHLORIDE SCH MLS/HR: 0.9 INJECTION, SOLUTION INTRAVENOUS at 01:42

## 2020-01-29 RX ADMIN — AMIODARONE HYDROCHLORIDE SCH MG: 200 TABLET ORAL at 09:52

## 2020-01-29 RX ADMIN — SODIUM CHLORIDE SCH MLS/HR: 0.9 INJECTION, SOLUTION INTRAVENOUS at 13:30

## 2020-01-29 RX ADMIN — INSULIN LISPRO SCH NOTE: 100 INJECTION, SOLUTION INTRAVENOUS; SUBCUTANEOUS at 07:30

## 2020-03-10 ENCOUNTER — HOSPITAL ENCOUNTER (OUTPATIENT)
Dept: HOSPITAL 8 - CFH | Age: 75
Discharge: HOME | End: 2020-03-10
Attending: NURSE PRACTITIONER
Payer: MEDICARE

## 2020-03-10 DIAGNOSIS — N95.8: ICD-10-CM

## 2020-03-10 DIAGNOSIS — Z87.891: ICD-10-CM

## 2020-03-10 DIAGNOSIS — Z12.2: Primary | ICD-10-CM

## 2020-03-10 PROCEDURE — G0297 LDCT FOR LUNG CA SCREEN: HCPCS

## 2020-03-10 PROCEDURE — 77080 DXA BONE DENSITY AXIAL: CPT

## 2020-04-17 ENCOUNTER — HOSPITAL ENCOUNTER (EMERGENCY)
Dept: HOSPITAL 8 - ED | Age: 75
Discharge: HOME | End: 2020-04-17
Payer: MEDICARE

## 2020-04-17 VITALS — SYSTOLIC BLOOD PRESSURE: 180 MMHG | DIASTOLIC BLOOD PRESSURE: 97 MMHG

## 2020-04-17 VITALS — HEIGHT: 60 IN | WEIGHT: 238.54 LBS | BODY MASS INDEX: 46.83 KG/M2
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
## 2020-04-17 DIAGNOSIS — I48.91: ICD-10-CM
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
## 2020-04-17 DIAGNOSIS — I47.1: ICD-10-CM

## 2020-04-17 DIAGNOSIS — S42.202A: Primary | ICD-10-CM

## 2020-04-17 DIAGNOSIS — Z90.710: ICD-10-CM
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
## 2020-04-17 DIAGNOSIS — Z90.49: ICD-10-CM

## 2020-04-17 DIAGNOSIS — E78.00: ICD-10-CM
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
## 2020-04-17 PROCEDURE — 29105 APPLICATION LONG ARM SPLINT: CPT

## 2020-04-17 PROCEDURE — 99283 EMERGENCY DEPT VISIT LOW MDM: CPT

## 2020-04-17 PROCEDURE — 73030 X-RAY EXAM OF SHOULDER: CPT

## 2020-04-17 NOTE — NUR
RN returned from break. Patient resting, medicated for pain and nausea 
(prophylactically) awaiting radiology read of imaging.

## 2020-11-03 ENCOUNTER — HOSPITAL ENCOUNTER (OUTPATIENT)
Dept: HOSPITAL 8 - CVU | Age: 75
Discharge: HOME | End: 2020-11-03
Attending: INTERNAL MEDICINE
Payer: MEDICARE

## 2020-11-03 DIAGNOSIS — M79.661: ICD-10-CM

## 2020-11-03 DIAGNOSIS — R60.9: ICD-10-CM

## 2020-11-03 DIAGNOSIS — M79.89: Primary | ICD-10-CM

## 2020-11-03 DIAGNOSIS — M79.662: ICD-10-CM

## 2020-11-03 PROCEDURE — 93970 EXTREMITY STUDY: CPT

## 2020-12-21 ENCOUNTER — APPOINTMENT (OUTPATIENT)
Dept: RADIOLOGY | Facility: MEDICAL CENTER | Age: 75
DRG: 492 | End: 2020-12-21
Attending: EMERGENCY MEDICINE
Payer: COMMERCIAL

## 2020-12-21 ENCOUNTER — HOSPITAL ENCOUNTER (INPATIENT)
Facility: MEDICAL CENTER | Age: 75
LOS: 10 days | DRG: 492 | End: 2020-12-31
Attending: EMERGENCY MEDICINE | Admitting: INTERNAL MEDICINE
Payer: COMMERCIAL

## 2020-12-21 DIAGNOSIS — S82.891K CLOSED FRACTURE OF RIGHT ANKLE WITH NONUNION: ICD-10-CM

## 2020-12-21 DIAGNOSIS — U07.1 ACUTE HYPOXEMIC RESPIRATORY FAILURE DUE TO COVID-19 (HCC): ICD-10-CM

## 2020-12-21 DIAGNOSIS — U07.1 COVID-19: ICD-10-CM

## 2020-12-21 DIAGNOSIS — S82.851A TRIMALLEOLAR FRACTURE OF ANKLE, CLOSED, RIGHT, INITIAL ENCOUNTER: Primary | ICD-10-CM

## 2020-12-21 DIAGNOSIS — R29.6 FALLS FREQUENTLY: ICD-10-CM

## 2020-12-21 DIAGNOSIS — J96.01 ACUTE HYPOXEMIC RESPIRATORY FAILURE DUE TO COVID-19 (HCC): ICD-10-CM

## 2020-12-21 DIAGNOSIS — M25.571 ACUTE RIGHT ANKLE PAIN: ICD-10-CM

## 2020-12-21 DIAGNOSIS — R09.02 HYPOXIA: ICD-10-CM

## 2020-12-21 PROBLEM — Z79.01 CHRONIC ANTICOAGULATION: Status: ACTIVE | Noted: 2020-12-21

## 2020-12-21 PROBLEM — E66.01 MORBID OBESITY (HCC): Status: ACTIVE | Noted: 2020-12-21

## 2020-12-21 LAB
ANION GAP SERPL CALC-SCNC: 14 MMOL/L (ref 7–16)
BASOPHILS # BLD AUTO: 0.3 % (ref 0–1.8)
BASOPHILS # BLD: 0.03 K/UL (ref 0–0.12)
BUN SERPL-MCNC: 24 MG/DL (ref 8–22)
CALCIUM SERPL-MCNC: 9.4 MG/DL (ref 8.5–10.5)
CHLORIDE SERPL-SCNC: 93 MMOL/L (ref 96–112)
CO2 SERPL-SCNC: 24 MMOL/L (ref 20–33)
COVID ORDER STATUS COVID19: NORMAL
CREAT SERPL-MCNC: 1.27 MG/DL (ref 0.5–1.4)
D DIMER PPP IA.FEU-MCNC: 3.66 UG/ML (FEU) (ref 0–0.5)
EKG IMPRESSION: NORMAL
EOSINOPHIL # BLD AUTO: 0.02 K/UL (ref 0–0.51)
EOSINOPHIL NFR BLD: 0.2 % (ref 0–6.9)
ERYTHROCYTE [DISTWIDTH] IN BLOOD BY AUTOMATED COUNT: 47.8 FL (ref 35.9–50)
EST. AVERAGE GLUCOSE BLD GHB EST-MCNC: 120 MG/DL
FLUAV RNA SPEC QL NAA+PROBE: NEGATIVE
FLUBV RNA SPEC QL NAA+PROBE: NEGATIVE
GLUCOSE SERPL-MCNC: 93 MG/DL (ref 65–99)
HBA1C MFR BLD: 5.8 % (ref 0–5.6)
HCT VFR BLD AUTO: 39.1 % (ref 37–47)
HGB BLD-MCNC: 12.2 G/DL (ref 12–16)
IMM GRANULOCYTES # BLD AUTO: 0.06 K/UL (ref 0–0.11)
IMM GRANULOCYTES NFR BLD AUTO: 0.6 % (ref 0–0.9)
INR PPP: 2.42 (ref 0.87–1.13)
IRON SATN MFR SERPL: 9 % (ref 15–55)
IRON SERPL-MCNC: 25 UG/DL (ref 40–170)
LYMPHOCYTES # BLD AUTO: 0.9 K/UL (ref 1–4.8)
LYMPHOCYTES NFR BLD: 8.7 % (ref 22–41)
MAGNESIUM SERPL-MCNC: 1.9 MG/DL (ref 1.5–2.5)
MCH RBC QN AUTO: 25.7 PG (ref 27–33)
MCHC RBC AUTO-ENTMCNC: 31.2 G/DL (ref 33.6–35)
MCV RBC AUTO: 82.5 FL (ref 81.4–97.8)
MONOCYTES # BLD AUTO: 0.75 K/UL (ref 0–0.85)
MONOCYTES NFR BLD AUTO: 7.3 % (ref 0–13.4)
NEUTROPHILS # BLD AUTO: 8.58 K/UL (ref 2–7.15)
NEUTROPHILS NFR BLD: 82.9 % (ref 44–72)
NRBC # BLD AUTO: 0 K/UL
NRBC BLD-RTO: 0 /100 WBC
PLATELET # BLD AUTO: 248 K/UL (ref 164–446)
PMV BLD AUTO: 9.4 FL (ref 9–12.9)
POTASSIUM SERPL-SCNC: 3.5 MMOL/L (ref 3.6–5.5)
PROCALCITONIN SERPL-MCNC: <0.05 NG/ML
PROTHROMBIN TIME: 27.1 SEC (ref 12–14.6)
RBC # BLD AUTO: 4.74 M/UL (ref 4.2–5.4)
RSV RNA SPEC QL NAA+PROBE: NEGATIVE
SARS-COV-2 RNA RESP QL NAA+PROBE: DETECTED
SODIUM SERPL-SCNC: 131 MMOL/L (ref 135–145)
SPECIMEN SOURCE: ABNORMAL
TIBC SERPL-MCNC: 291 UG/DL (ref 250–450)
UIBC SERPL-MCNC: 266 UG/DL (ref 110–370)
WBC # BLD AUTO: 10.3 K/UL (ref 4.8–10.8)

## 2020-12-21 PROCEDURE — 83735 ASSAY OF MAGNESIUM: CPT

## 2020-12-21 PROCEDURE — 85610 PROTHROMBIN TIME: CPT

## 2020-12-21 PROCEDURE — 700101 HCHG RX REV CODE 250: Performed by: EMERGENCY MEDICINE

## 2020-12-21 PROCEDURE — 83036 HEMOGLOBIN GLYCOSYLATED A1C: CPT

## 2020-12-21 PROCEDURE — 71045 X-RAY EXAM CHEST 1 VIEW: CPT

## 2020-12-21 PROCEDURE — 770020 HCHG ROOM/CARE - TELE (206)

## 2020-12-21 PROCEDURE — 73610 X-RAY EXAM OF ANKLE: CPT | Mod: RT

## 2020-12-21 PROCEDURE — 80048 BASIC METABOLIC PNL TOTAL CA: CPT

## 2020-12-21 PROCEDURE — 700102 HCHG RX REV CODE 250 W/ 637 OVERRIDE(OP): Performed by: STUDENT IN AN ORGANIZED HEALTH CARE EDUCATION/TRAINING PROGRAM

## 2020-12-21 PROCEDURE — A9270 NON-COVERED ITEM OR SERVICE: HCPCS | Performed by: STUDENT IN AN ORGANIZED HEALTH CARE EDUCATION/TRAINING PROGRAM

## 2020-12-21 PROCEDURE — 73600 X-RAY EXAM OF ANKLE: CPT | Mod: RT

## 2020-12-21 PROCEDURE — 29515 APPLICATION SHORT LEG SPLINT: CPT

## 2020-12-21 PROCEDURE — 84145 PROCALCITONIN (PCT): CPT

## 2020-12-21 PROCEDURE — 93010 ELECTROCARDIOGRAM REPORT: CPT | Performed by: INTERNAL MEDICINE

## 2020-12-21 PROCEDURE — 302874 HCHG BANDAGE ACE 2 OR 3""

## 2020-12-21 PROCEDURE — 700102 HCHG RX REV CODE 250 W/ 637 OVERRIDE(OP): Performed by: NURSE PRACTITIONER

## 2020-12-21 PROCEDURE — 83540 ASSAY OF IRON: CPT

## 2020-12-21 PROCEDURE — 700111 HCHG RX REV CODE 636 W/ 250 OVERRIDE (IP)

## 2020-12-21 PROCEDURE — 85025 COMPLETE CBC W/AUTO DIFF WBC: CPT

## 2020-12-21 PROCEDURE — 99221 1ST HOSP IP/OBS SF/LOW 40: CPT | Performed by: INTERNAL MEDICINE

## 2020-12-21 PROCEDURE — 96374 THER/PROPH/DIAG INJ IV PUSH: CPT

## 2020-12-21 PROCEDURE — 83550 IRON BINDING TEST: CPT

## 2020-12-21 PROCEDURE — 27840 TREAT ANKLE DISLOCATION: CPT

## 2020-12-21 PROCEDURE — A9270 NON-COVERED ITEM OR SERVICE: HCPCS | Performed by: NURSE PRACTITIONER

## 2020-12-21 PROCEDURE — 302875 HCHG BANDAGE ACE 4 OR 6""

## 2020-12-21 PROCEDURE — C9803 HOPD COVID-19 SPEC COLLECT: HCPCS | Performed by: EMERGENCY MEDICINE

## 2020-12-21 PROCEDURE — 0241U HCHG SARS-COV-2 COVID-19 NFCT DS RESP RNA 4 TRGT MIC: CPT

## 2020-12-21 PROCEDURE — 700111 HCHG RX REV CODE 636 W/ 250 OVERRIDE (IP): Performed by: STUDENT IN AN ORGANIZED HEALTH CARE EDUCATION/TRAINING PROGRAM

## 2020-12-21 PROCEDURE — 85379 FIBRIN DEGRADATION QUANT: CPT

## 2020-12-21 PROCEDURE — 99285 EMERGENCY DEPT VISIT HI MDM: CPT

## 2020-12-21 PROCEDURE — 93005 ELECTROCARDIOGRAM TRACING: CPT | Performed by: STUDENT IN AN ORGANIZED HEALTH CARE EDUCATION/TRAINING PROGRAM

## 2020-12-21 RX ORDER — AMIODARONE HYDROCHLORIDE 200 MG/1
200 TABLET ORAL DAILY
Status: DISCONTINUED | OUTPATIENT
Start: 2020-12-21 | End: 2020-12-31 | Stop reason: HOSPADM

## 2020-12-21 RX ORDER — DEXAMETHASONE 4 MG/1
6 TABLET ORAL DAILY
Status: COMPLETED | OUTPATIENT
Start: 2020-12-21 | End: 2020-12-30

## 2020-12-21 RX ORDER — ONDANSETRON 4 MG/1
4 TABLET, ORALLY DISINTEGRATING ORAL EVERY 4 HOURS PRN
Status: DISCONTINUED | OUTPATIENT
Start: 2020-12-21 | End: 2020-12-21

## 2020-12-21 RX ORDER — ONDANSETRON 2 MG/ML
4 INJECTION INTRAMUSCULAR; INTRAVENOUS EVERY 4 HOURS PRN
Status: DISCONTINUED | OUTPATIENT
Start: 2020-12-21 | End: 2020-12-21

## 2020-12-21 RX ORDER — ATORVASTATIN CALCIUM 20 MG/1
20 TABLET, FILM COATED ORAL EVERY EVENING
COMMUNITY

## 2020-12-21 RX ORDER — OXYCODONE HYDROCHLORIDE 5 MG/1
5 TABLET ORAL EVERY 6 HOURS PRN
Status: DISCONTINUED | OUTPATIENT
Start: 2020-12-21 | End: 2020-12-23

## 2020-12-21 RX ORDER — BISACODYL 10 MG
10 SUPPOSITORY, RECTAL RECTAL
Status: DISCONTINUED | OUTPATIENT
Start: 2020-12-21 | End: 2020-12-23

## 2020-12-21 RX ORDER — HYDROCODONE BITARTRATE AND ACETAMINOPHEN 5; 325 MG/1; MG/1
TABLET ORAL
COMMUNITY
End: 2020-12-21

## 2020-12-21 RX ORDER — OXYCODONE HYDROCHLORIDE 5 MG/1
2.5 TABLET ORAL EVERY 6 HOURS PRN
Status: DISCONTINUED | OUTPATIENT
Start: 2020-12-21 | End: 2020-12-23

## 2020-12-21 RX ORDER — HYDROMORPHONE HYDROCHLORIDE 1 MG/ML
0.25 INJECTION, SOLUTION INTRAMUSCULAR; INTRAVENOUS; SUBCUTANEOUS EVERY 6 HOURS PRN
Status: DISCONTINUED | OUTPATIENT
Start: 2020-12-21 | End: 2020-12-23

## 2020-12-21 RX ORDER — ONDANSETRON 4 MG/1
TABLET, ORALLY DISINTEGRATING ORAL
COMMUNITY
End: 2020-12-21

## 2020-12-21 RX ORDER — POLYETHYLENE GLYCOL 3350 17 G/17G
1 POWDER, FOR SOLUTION ORAL
Status: DISCONTINUED | OUTPATIENT
Start: 2020-12-21 | End: 2020-12-23

## 2020-12-21 RX ORDER — ATORVASTATIN CALCIUM 20 MG/1
20 TABLET, FILM COATED ORAL EVERY EVENING
Status: DISCONTINUED | OUTPATIENT
Start: 2020-12-21 | End: 2020-12-31 | Stop reason: HOSPADM

## 2020-12-21 RX ORDER — AMOXICILLIN 250 MG
2 CAPSULE ORAL 2 TIMES DAILY
Status: DISCONTINUED | OUTPATIENT
Start: 2020-12-21 | End: 2020-12-23

## 2020-12-21 RX ORDER — HYDROCHLOROTHIAZIDE 25 MG/1
25 TABLET ORAL EVERY MORNING
COMMUNITY

## 2020-12-21 RX ORDER — AMIODARONE HYDROCHLORIDE 200 MG/1
200 TABLET ORAL EVERY MORNING
COMMUNITY

## 2020-12-21 RX ORDER — BUPIVACAINE HYDROCHLORIDE AND EPINEPHRINE 5; 5 MG/ML; UG/ML
20 INJECTION, SOLUTION EPIDURAL; INTRACAUDAL; PERINEURAL ONCE
Status: COMPLETED | OUTPATIENT
Start: 2020-12-21 | End: 2020-12-21

## 2020-12-21 RX ADMIN — OXYCODONE 5 MG: 5 TABLET ORAL at 10:48

## 2020-12-21 RX ADMIN — ENOXAPARIN SODIUM 40 MG: 40 INJECTION SUBCUTANEOUS at 17:50

## 2020-12-21 RX ADMIN — AMIODARONE HYDROCHLORIDE 200 MG: 200 TABLET ORAL at 10:48

## 2020-12-21 RX ADMIN — FENTANYL CITRATE 50 MCG: 50 INJECTION, SOLUTION INTRAMUSCULAR; INTRAVENOUS at 05:09

## 2020-12-21 RX ADMIN — DEXAMETHASONE 6 MG: 4 TABLET ORAL at 10:47

## 2020-12-21 RX ADMIN — OXYCODONE 5 MG: 5 TABLET ORAL at 17:50

## 2020-12-21 RX ADMIN — ATORVASTATIN CALCIUM 20 MG: 20 TABLET, FILM COATED ORAL at 17:50

## 2020-12-21 RX ADMIN — BUPIVACAINE HYDROCHLORIDE AND EPINEPHRINE 20 ML: 5; 5 INJECTION, SOLUTION EPIDURAL; INTRACAUDAL; PERINEURAL at 04:30

## 2020-12-21 ASSESSMENT — ENCOUNTER SYMPTOMS
MYALGIAS: 0
BLURRED VISION: 0
NECK PAIN: 0
SHORTNESS OF BREATH: 0
NAUSEA: 0
DIZZINESS: 0
PALPITATIONS: 0
HEADACHES: 0
DOUBLE VISION: 0
CHILLS: 0
SORE THROAT: 0
INSOMNIA: 0
LOSS OF CONSCIOUSNESS: 0
COUGH: 0
ABDOMINAL PAIN: 0
BACK PAIN: 0
BRUISES/BLEEDS EASILY: 0
DEPRESSION: 0
FEVER: 0
WEAKNESS: 1
VOMITING: 0
STRIDOR: 0
WEIGHT LOSS: 0
HEMOPTYSIS: 0

## 2020-12-21 ASSESSMENT — PATIENT HEALTH QUESTIONNAIRE - PHQ9
SUM OF ALL RESPONSES TO PHQ9 QUESTIONS 1 AND 2: 0
2. FEELING DOWN, DEPRESSED, IRRITABLE, OR HOPELESS: NOT AT ALL
1. LITTLE INTEREST OR PLEASURE IN DOING THINGS: NOT AT ALL

## 2020-12-21 ASSESSMENT — PAIN DESCRIPTION - PAIN TYPE
TYPE: ACUTE PAIN

## 2020-12-21 ASSESSMENT — LIFESTYLE VARIABLES: SUBSTANCE_ABUSE: 0

## 2020-12-21 NOTE — PROGRESS NOTES
Patient transported to T7 unit, pt care assumed, VSS, pt assessment complete. Pt AAOx4, with complaint of right ankle pain at this time, will administer pain medications when available. On 2L NC, no signs of acute distress noted at this time. Plan of care discussed with pt and verbalizes no questions. Pt denies any additional needs at this time. Bed locked/in lowest position, pt educated on fall risk and verbalized understanding, call light within reach, hourly rounding initiated.   Patient reports that she will update her son herself via phone.

## 2020-12-21 NOTE — DIETARY
NUTRITION SERVICES: BMI - Pt with BMI >40 (=Body mass index is 46.87 kg/m².), morbid obesity. Weight loss counseling not appropriate in acute care setting.     Of note, high BMI based on stated weight. Please obtain measured weight as able.    RECOMMEND - Referral to outpatient nutrition services for weight management after D/C.

## 2020-12-21 NOTE — ED NOTES
Attending Hospitalist is Natali VILLA with Dr Amezcua starting at 0700. Please contact this physician for orders, updates or questions today.

## 2020-12-21 NOTE — ED TRIAGE NOTES
.  Vitals:    12/21/20 0340   BP: 135/64   Pulse: 94   Resp: 16   Temp: 36.8 °C (98.2 °F)   SpO2: 91%     .  Chief Complaint   Patient presents with   • Ankle Pain     pt felt dizzy after using restroom, stood up, GLF, unable to get up r/t R ankle pain     Pt was at home. She got up to use the restroom. When she stood to pull her pants up she felt dizzy and the next thing she knew she was on the floor. She was unable to get off of the floor even with the help of her son, therefore they called EMS. She c/o RLE ankle pain 7 out of 10. Pt states she has broken both shoulders in the past year related to GLFs as well.

## 2020-12-21 NOTE — H&P
Hospital Medicine History & Physical Note    Date of Service  12/21/2020    Primary Care Physician  Pcp Pt States None    Consultants  Orthopedic surgery    Code Status  No Order    Chief Complaint  Chief Complaint   Patient presents with   • Ankle Pain     pt felt dizzy after using restroom, stood up, GLF, unable to get up r/t R ankle pain       History of Presenting Illness  75 y.o. female who presented 12/21/2020 with history of morbid obesity, SVT on Xarelto and 3 falls over the last year resulting in fractures.  She presents with pain in her right ankle after ground-level fall at home.  Just prior to arrival she was sitting on the toilet, when she stood up she felt off balance when trying to pull up her pants.   She does not believe she lost consciousness and denies any head trauma.  She remembers her son coming to her and calling EMS.  She denies palpitations, shortness of breath, no chest pain.  She was unable to stand up due to pain at her right ankle.  She says pain is 7 out of 10, sharp and severe with any movement.  In the emergency department she is found to have a trimalleolar fracture with subsequent reduction.  Orthopedic surgery is consulted recommending holding Xarelto and possibility of intervention.       Review of Systems  Review of Systems   Constitutional: Negative for fever, malaise/fatigue and weight loss.   HENT: Negative for sore throat and tinnitus.    Eyes: Negative for blurred vision and double vision.   Respiratory: Negative for cough, hemoptysis and stridor.    Cardiovascular: Negative for chest pain and palpitations.   Gastrointestinal: Negative for nausea and vomiting.   Genitourinary: Negative for dysuria and urgency.   Musculoskeletal: Negative for myalgias and neck pain.   Skin: Negative for itching and rash.   Neurological: Positive for weakness. Negative for dizziness and headaches.   Endo/Heme/Allergies: Does not bruise/bleed easily.   Psychiatric/Behavioral: Negative for  depression. The patient does not have insomnia.        Past Medical History   has no past medical history on file.    Surgical History   has no past surgical history on file.     Family History  family history is not on file.     Social History       Allergies  No Known Allergies    Medications  None       Physical Exam  Temp:  [36.8 °C (98.2 °F)] 36.8 °C (98.2 °F)  Pulse:  [79-94] 81  Resp:  [16-20] 20  BP: (111-139)/(56-76) 118/62  SpO2:  [90 %-96 %] 96 %    Physical Exam  Vitals signs and nursing note reviewed. Exam conducted with a chaperone present.   Constitutional:       General: She is not in acute distress.     Appearance: Normal appearance. She is normal weight. She is not toxic-appearing.      Comments: Super morbidly obese and deconditioned   HENT:      Head: Normocephalic and atraumatic.      Nose: Nose normal. No congestion or rhinorrhea.      Mouth/Throat:      Mouth: Mucous membranes are moist.      Pharynx: Oropharynx is clear.   Eyes:      Extraocular Movements: Extraocular movements intact.      Conjunctiva/sclera: Conjunctivae normal.      Pupils: Pupils are equal, round, and reactive to light.   Neck:      Musculoskeletal: Normal range of motion and neck supple. No muscular tenderness.      Vascular: No carotid bruit.   Cardiovascular:      Rate and Rhythm: Normal rate and regular rhythm.      Pulses: Normal pulses.      Heart sounds: Normal heart sounds. No murmur. No gallop.    Pulmonary:      Effort: No respiratory distress.      Breath sounds: Normal breath sounds. No wheezing or rales.   Abdominal:      General: Abdomen is flat. Bowel sounds are normal. There is no distension.      Palpations: Abdomen is soft. There is no mass.      Tenderness: There is no abdominal tenderness.      Hernia: No hernia is present.   Musculoskeletal:         General: Tenderness present. No signs of injury.      Comments: Right leg and foot immobilized   Lymphadenopathy:      Cervical: No cervical adenopathy.    Skin:     Capillary Refill: Capillary refill takes less than 2 seconds.      Coloration: Skin is not jaundiced or pale.      Findings: No bruising.   Neurological:      General: No focal deficit present.      Mental Status: She is alert and oriented to person, place, and time. Mental status is at baseline.      Cranial Nerves: No cranial nerve deficit.      Motor: No weakness.      Coordination: Coordination normal.   Psychiatric:         Mood and Affect: Mood normal.         Thought Content: Thought content normal.         Judgment: Judgment normal.         Laboratory:          No results for input(s): ALTSGPT, ASTSGOT, ALKPHOSPHAT, TBILIRUBIN, DBILIRUBIN, GAMMAGT, AMYLASE, LIPASE, ALB, PREALBUMIN, GLUCOSE in the last 72 hours.      No results for input(s): NTPROBNP in the last 72 hours.      No results for input(s): TROPONINT in the last 72 hours.    Imaging:  DX-ANKLE 2- VIEWS RIGHT   Final Result      Improved alignment of ankle subluxation and trimalleolar fracture status post closed reduction and splinting.            DX-CHEST-PORTABLE (1 VIEW)   Final Result      Cardiac silhouette enlargement.            DX-ANKLE 2- VIEWS RIGHT   Final Result      Mildly improved alignment of ankle subluxation and trimalleolar fracture status post closed reduction.         DX-ANKLE 3+ VIEWS RIGHT   Final Result      Trimalleolar fracture with ankle subluxation.               Assessment/Plan:  I anticipate this patient is appropriate for observation status at this time.    Falls frequently  Assessment & Plan  Associated with severe physical deconditioning.  Follow-up PT eval    Right ankle pain  Assessment & Plan  Symptomatic management with NSAIDs limiting narcotics secondary to risk of morbid obesity hypoventilation    Morbid obesity (HCC)  Assessment & Plan  Follow-up TSH    Chronic anticoagulation  Assessment & Plan  On Xarelto for history of SVT.  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  Symptomatic management, follow-up orthopedic surgery and physical therapy consult

## 2020-12-21 NOTE — ASSESSMENT & PLAN NOTE
Symptomatic management with NSAIDs limiting narcotics secondary to risk of morbid obesity hypoventilation

## 2020-12-21 NOTE — ED PROVIDER NOTES
"ED Provider Note     12/21/2020  3:32 AM    Means of Arrival: EMS  History obtained by: patient, EMS  Limitations: none  PCP: None  CODE STATUS: Full    CHIEF COMPLAINT  Near syncopal episode    HPI  Linda Goodwin is a 75 y.o. female history of SVT and takes Xarelto for anticoagulation, obesity who presents with pain in her right ankle after ground-level fall at home.  Just prior to arrival she was sitting on the toilet, when she stood up she felt light headed and fell to the ground.  She says \"I crumbled.\"  She does not believe she lost consciousness.  Denies any head trauma.  She remembers her son coming to her and calling EMS.  She denies any headache.  No shortness of breath, no chest pain.  She was unable to stand up due to pain at her right ankle.  She says pain is 7 out of 10, sharp and severe with any movement.    REVIEW OF SYSTEMS  Review of Systems   Constitutional: Negative for chills and fever.   HENT: Negative for congestion and sore throat.    Respiratory: Negative for cough and shortness of breath.    Cardiovascular: Negative for chest pain.   Gastrointestinal: Negative for abdominal pain, nausea and vomiting.   Genitourinary: Negative for dysuria.   Musculoskeletal: Negative for back pain and neck pain.        Right ankle pain   Neurological: Negative for dizziness, loss of consciousness and headaches.   Psychiatric/Behavioral: Negative for substance abuse.   All other systems reviewed and are negative.    See HPI for further details.    PAST MEDICAL HISTORY   SVT, chronic anticoagulation obesity    SOCIAL HISTORY  Social History     Tobacco Use   • Smoking status: Not on file   Substance and Sexual Activity   • Alcohol use: Not on file   • Drug use: Not on file   • Sexual activity: Not on file       SURGICAL HISTORY  patient denies any surgical history    CURRENT MEDICATIONS  Home Medications    **Home medications have not yet been reviewed for this encounter**         ALLERGIES  No Known " Allergies    PHYSICAL EXAM  VITAL SIGNS: /62   Pulse 81   Temp 36.8 °C (98.2 °F) (Temporal)   Resp 20   Ht 1.524 m (5')   Wt 108.9 kg (240 lb)   SpO2 96%   BMI 46.87 kg/m²     Pulse ox interpretation: I interpret this pulse ox as normal.  Normal waveform with saturations dropping as low as 88%, now on 2 L nasal cannula.  Constitutional: Alert in no apparent distress.  Pleasant 75-year-old woman.  HENT: No signs of trauma, Bilateral external ears normal, Nose normal.   Eyes: Pupils are equal, Conjunctiva normal, Non-icteric.   Neck: Normal range of motion, No tenderness, Supple, No stridor.  No midline cervical spine tenderness.  Cardiovascular: Regular rate and rhythm, no murmurs. Symmetric distal pulses. No cyanosis of extremities. No peripheral edema of extremities.  Thorax & Lungs: Normal breath sounds, No respiratory distress, No wheezing, No chest tenderness.   Abdomen: Soft, No tenderness  Skin: Warm, Dry, No erythema, No rash.    Back: No midline bony tenderness, No CVA tenderness.   Musculoskeletal: Right ankle with deformity, edema, ecchymosis at the bilateral malleoli.  Neurologic: Alert and oriented to person place time situation.  Except for the right ankle she has normal strength in all major joints.  No facial droop.  No slurred speech..   Psychiatric: Affect normal, Judgment normal, Mood normal.   Physical Exam      DIAGNOSTIC STUDIES / PROCEDURES    LABS  Pertinent Labs & Imaging studies reviewed. (See chart for details)    RADIOLOGY  Pertinent Labs & Imaging studies reviewed. (See chart for details)    COURSE & MEDICAL DECISION MAKING  Pertinent Labs & Imaging studies reviewed. (See chart for details)    3:32 AM This is an emergent evaluation of a  75 y.o. female who presents with likely fracture to her right ankle.  X-rays will be done.  We discussed at length if she may have had any head trauma.  She denies any feelings of headache.  She does not believe she struck her head on the  ground.  She does not believe she lost consciousness.  Anticipate she will need ankle reduction.    4:40 AM  Trimalleolar fracture apparent on x-ray with medial malleolus subluxation.   Hematoma block done prior to reduction.  She was given fentanyl for pain.  Reduction was able to palpate part reduction of subluxation.  Then with splinting was able to hold tibia in anatomical position.  X-ray confirmed much improved alignment.  I spoke with Dr. Vazquez, [facility] orthopedics.  He has reviewed images.  Because of her age and obesity, she is not a good candidate to discharge home with crutches.  She will be hospitalized with the hospitalist service.  Dr. Santoro has agreed to facilitate this.  Orthopedic service will evaluate her further today to decide on time of surgery.  At this time she will remain n.p.o. and Xarelto will be held.  She did require 2 L nasal cannula oxygen.  A checks x-ray was done and this did not reveal any consolidations, or traumatic injuries to the thorax.  I suspect that she is having obesity related hypoventilation as the cause of mild hypoxia.  I have also ordered serum studies to facilitate inpatient stay, surgery.  Those will be followed up with inpatient team.    6:52 AM  COVID 19 positive. This adds more support of cause of hypoxia. She has been notified of positive result. I have also updated consultants.       REDUCTION PROCEDURE NOTE:  Patient identification was confirmed, consent was obtained verbally.  Site: Right ankle  Anesthetic used (type and amt): Hematoma block with 10 cc of 0.5% bupivacaine  Pre-procedure N/V exam.  5-5 strength at all toes, sensation intact, brisk capillary refill and palpable DP pulse.  # of attempts: 1  Type of splint: Short leg posterior with stirrup  Pt anesthetized, fx/dislocation reduced successfully and traction countertraction.  Patient tolerated procedure well without complications. Patient splinted. Post-procedure exam indicates patient is  n/v intact distal to the injury site. Post-procedure films show excellent alignment.         FINAL IMPRESSION    ICD-10-CM   1. Trimalleolar fracture of ankle, closed, right, initial encounter Active S82.851A   2. COVID-19  U07.1   3. Hypoxia  R09.02            This dictation was created using voice recognition software. The accuracy of the dictation is limited to the abilities of the software. I expect there may be some errors of grammar and possibly content. The nursing notes were reviewed and certain aspects of this information were incorporated into this note.    Electronically signed by: Hoang Alexander II, M.D., 12/21/2020 3:32 AM

## 2020-12-21 NOTE — ASSESSMENT & PLAN NOTE
Associated with severe physical deconditioning.  Follow-up PT eval, however patient is not cooperative

## 2020-12-21 NOTE — PROGRESS NOTES
Dr. Vazquez was consulted by Dr. Alexander in the ED to evaluated this patients ankle injury.  She had a closed reduction in the ED and was placed into a splint.  Dr. Vazquez asked me to provide definitve treatment recommendations, consider surgical treatment and coordinate optimal surgical timing with regard to her morbid obesity and covid positive status.  However, it appears that Dr. Robles has added the patient for surgery this afternoon.  I will sign off.

## 2020-12-21 NOTE — ASSESSMENT & PLAN NOTE
Reduction performed in the emergency department.    Ortho consulted: s/p ORIF R ankle 12/23  Pain managements  Weight Bearing Status-TTWB x 6 weeks  PT/OT: post acute.  Patient refuses SNF and is aware of consequences of discharging home including serious injury, death. Patient is A&O x3 and able to make own decisions.   Will discharge to home with home health when stable.

## 2020-12-21 NOTE — ED NOTES
Bedside report given to MACK Castillo, pt transported to [id number], all pts belongings and chart sent with pt.

## 2020-12-21 NOTE — PROGRESS NOTES
AFTER [place] ADMISSION BY DR. PRINCE:    Ms. Goodwin is a 75-year-old female with a past medical history of recurrent falls over the last year resulting in fractures who presented to the emergency department on 12/21/2020 with right ankle pain after she sustained a ground-level fall at home.  She had reportedly been sitting on the toilet when she stood up and felt dizzy while trying to pull up her pants.  She then fell but did not lose consciousness and denied any head trauma.  Imaging done in the ED showed a trimalleolar fracture with medial malleolus subluxation.  Orthopedic surgery was consulted and performed reduction in the ED.  Also in the ED she was found to be hypoxic and was placed on supplemental oxygen.  Her chest x-ray was negative though her COVID PCR was positive.  She was subsequently placed on isolation precautions and admitted to the hospital for surgical evaluation and further treatment.      Assessment and plan:    1.  Start 10-day course of p.o. dexamethasone.    2.  Wean off oxygen as tolerated.    3.  Waiting on decision from orthopedic surgery on surgical intervention.  Received the okay from them to start prophylactic lovenox today.  Keep n.p.o. for now.    4.  Add pain medication.    5.  DNR status per patient request.    6.  Admit to COVID unit.     7.  Check d-dimer, procal, INR, iron panel.         Natali Mcbride, MSN, RN, APRN, ACNPC-AG, CCRN  Nurse Practitioner, [facility]  [phone]    12/21/2020    1:03 PM

## 2020-12-21 NOTE — PROGRESS NOTES
Received call from preop RN that patient would be going down to preop for right ankle surgery later this afternoon. Updated patient, however she expressed that she is not sure she would like surgery. Notified Dr. Robles via Voalte to speak with patient, MD to come to bedside.

## 2020-12-21 NOTE — ED NOTES
Dr. Santoro notified of COVID positive result. Pt notified as well. Pt's son Jeffrey also notified.

## 2020-12-22 PROBLEM — E87.1 HYPONATREMIA: Status: ACTIVE | Noted: 2020-12-22

## 2020-12-22 PROBLEM — D72.829 LEUKOCYTOSIS: Status: ACTIVE | Noted: 2020-12-22

## 2020-12-22 PROBLEM — M25.571 RIGHT ANKLE PAIN: Status: RESOLVED | Noted: 2020-12-21 | Resolved: 2020-12-22

## 2020-12-22 PROBLEM — I47.10 SVT (SUPRAVENTRICULAR TACHYCARDIA) (HCC): Status: ACTIVE | Noted: 2020-12-22

## 2020-12-22 PROBLEM — U07.1 ACUTE HYPOXEMIC RESPIRATORY FAILURE DUE TO COVID-19 (HCC): Status: ACTIVE | Noted: 2020-12-22

## 2020-12-22 PROBLEM — N17.9 AKI (ACUTE KIDNEY INJURY) (HCC): Status: ACTIVE | Noted: 2020-12-22

## 2020-12-22 PROBLEM — J96.01 ACUTE HYPOXEMIC RESPIRATORY FAILURE DUE TO COVID-19 (HCC): Status: ACTIVE | Noted: 2020-12-22

## 2020-12-22 PROBLEM — U07.1 COVID-19: Status: ACTIVE | Noted: 2020-12-22

## 2020-12-22 LAB
ANION GAP SERPL CALC-SCNC: 11 MMOL/L (ref 7–16)
BASOPHILS # BLD AUTO: 0.2 % (ref 0–1.8)
BASOPHILS # BLD: 0.02 K/UL (ref 0–0.12)
BUN SERPL-MCNC: 38 MG/DL (ref 8–22)
CALCIUM SERPL-MCNC: 9.5 MG/DL (ref 8.5–10.5)
CHLORIDE SERPL-SCNC: 95 MMOL/L (ref 96–112)
CO2 SERPL-SCNC: 23 MMOL/L (ref 20–33)
CREAT SERPL-MCNC: 1.51 MG/DL (ref 0.5–1.4)
EOSINOPHIL # BLD AUTO: 0.01 K/UL (ref 0–0.51)
EOSINOPHIL NFR BLD: 0.1 % (ref 0–6.9)
ERYTHROCYTE [DISTWIDTH] IN BLOOD BY AUTOMATED COUNT: 46.7 FL (ref 35.9–50)
GLUCOSE SERPL-MCNC: 120 MG/DL (ref 65–99)
HCT VFR BLD AUTO: 36.5 % (ref 37–47)
HGB BLD-MCNC: 11.4 G/DL (ref 12–16)
IMM GRANULOCYTES # BLD AUTO: 0.12 K/UL (ref 0–0.11)
IMM GRANULOCYTES NFR BLD AUTO: 1 % (ref 0–0.9)
LYMPHOCYTES # BLD AUTO: 1.18 K/UL (ref 1–4.8)
LYMPHOCYTES NFR BLD: 9.6 % (ref 22–41)
MCH RBC QN AUTO: 25.4 PG (ref 27–33)
MCHC RBC AUTO-ENTMCNC: 31.2 G/DL (ref 33.6–35)
MCV RBC AUTO: 81.5 FL (ref 81.4–97.8)
MONOCYTES # BLD AUTO: 1.03 K/UL (ref 0–0.85)
MONOCYTES NFR BLD AUTO: 8.4 % (ref 0–13.4)
NEUTROPHILS # BLD AUTO: 9.97 K/UL (ref 2–7.15)
NEUTROPHILS NFR BLD: 80.7 % (ref 44–72)
NRBC # BLD AUTO: 0 K/UL
NRBC BLD-RTO: 0 /100 WBC
PLATELET # BLD AUTO: 279 K/UL (ref 164–446)
PMV BLD AUTO: 10.3 FL (ref 9–12.9)
POTASSIUM SERPL-SCNC: 4.1 MMOL/L (ref 3.6–5.5)
RBC # BLD AUTO: 4.48 M/UL (ref 4.2–5.4)
SODIUM SERPL-SCNC: 129 MMOL/L (ref 135–145)
WBC # BLD AUTO: 12.3 K/UL (ref 4.8–10.8)

## 2020-12-22 PROCEDURE — 700105 HCHG RX REV CODE 258: Performed by: STUDENT IN AN ORGANIZED HEALTH CARE EDUCATION/TRAINING PROGRAM

## 2020-12-22 PROCEDURE — 700102 HCHG RX REV CODE 250 W/ 637 OVERRIDE(OP): Performed by: STUDENT IN AN ORGANIZED HEALTH CARE EDUCATION/TRAINING PROGRAM

## 2020-12-22 PROCEDURE — A9270 NON-COVERED ITEM OR SERVICE: HCPCS | Performed by: NURSE PRACTITIONER

## 2020-12-22 PROCEDURE — 700102 HCHG RX REV CODE 250 W/ 637 OVERRIDE(OP): Performed by: NURSE PRACTITIONER

## 2020-12-22 PROCEDURE — A9270 NON-COVERED ITEM OR SERVICE: HCPCS | Performed by: STUDENT IN AN ORGANIZED HEALTH CARE EDUCATION/TRAINING PROGRAM

## 2020-12-22 PROCEDURE — 85025 COMPLETE CBC W/AUTO DIFF WBC: CPT

## 2020-12-22 PROCEDURE — 700111 HCHG RX REV CODE 636 W/ 250 OVERRIDE (IP): Performed by: STUDENT IN AN ORGANIZED HEALTH CARE EDUCATION/TRAINING PROGRAM

## 2020-12-22 PROCEDURE — 99232 SBSQ HOSP IP/OBS MODERATE 35: CPT | Performed by: STUDENT IN AN ORGANIZED HEALTH CARE EDUCATION/TRAINING PROGRAM

## 2020-12-22 PROCEDURE — 770020 HCHG ROOM/CARE - TELE (206)

## 2020-12-22 PROCEDURE — 36415 COLL VENOUS BLD VENIPUNCTURE: CPT

## 2020-12-22 PROCEDURE — 80048 BASIC METABOLIC PNL TOTAL CA: CPT

## 2020-12-22 RX ORDER — SODIUM CHLORIDE 9 MG/ML
1000 INJECTION, SOLUTION INTRAVENOUS CONTINUOUS
Status: DISCONTINUED | OUTPATIENT
Start: 2020-12-22 | End: 2020-12-24

## 2020-12-22 RX ADMIN — AMIODARONE HYDROCHLORIDE 200 MG: 200 TABLET ORAL at 06:21

## 2020-12-22 RX ADMIN — OXYCODONE 5 MG: 5 TABLET ORAL at 06:25

## 2020-12-22 RX ADMIN — ENOXAPARIN SODIUM 40 MG: 40 INJECTION SUBCUTANEOUS at 06:22

## 2020-12-22 RX ADMIN — OXYCODONE 5 MG: 5 TABLET ORAL at 14:36

## 2020-12-22 RX ADMIN — SODIUM CHLORIDE 1000 ML: 9 INJECTION, SOLUTION INTRAVENOUS at 10:00

## 2020-12-22 RX ADMIN — OXYCODONE 5 MG: 5 TABLET ORAL at 00:28

## 2020-12-22 RX ADMIN — ATORVASTATIN CALCIUM 20 MG: 20 TABLET, FILM COATED ORAL at 17:03

## 2020-12-22 RX ADMIN — OXYCODONE 5 MG: 5 TABLET ORAL at 20:31

## 2020-12-22 RX ADMIN — DEXAMETHASONE 6 MG: 4 TABLET ORAL at 06:22

## 2020-12-22 ASSESSMENT — ENCOUNTER SYMPTOMS
WEAKNESS: 1
MYALGIAS: 0
COUGH: 0
FALLS: 1
BRUISES/BLEEDS EASILY: 0
CHILLS: 0
PALPITATIONS: 0
FOCAL WEAKNESS: 0
VOMITING: 0
HEMOPTYSIS: 0
BLURRED VISION: 0
FEVER: 0
DEPRESSION: 0
NAUSEA: 0

## 2020-12-22 ASSESSMENT — PAIN DESCRIPTION - PAIN TYPE: TYPE: ACUTE PAIN

## 2020-12-22 NOTE — CONSULTS
12/21/2020    Linda Goodwin is a 75 y.o. female who presents after a fall with a right ankle fracture and is here for management. Patient denies numbness, parasthesias, loss of conciousness or other trauma    History reviewed. No pertinent past medical history.    History reviewed. No pertinent surgical history.    Medications  No current facility-administered medications on file prior to encounter.      Current Outpatient Medications on File Prior to Encounter   Medication Sig Dispense Refill   • hydroCHLOROthiazide (HYDRODIURIL) 25 MG Tab Take 25 mg by mouth every morning.     • amiodarone (CORDARONE) 200 MG Tab Take 200 mg by mouth every morning.     • atorvastatin (LIPITOR) 20 MG Tab Take 20 mg by mouth every evening.     • rivaroxaban (XARELTO) 20 MG Tab tablet Take 20 mg by mouth with dinner.         Allergies  Patient has no known allergies.    ROS  Right ankle pain and deformity. All other systems were reviewed and found to be negative    History reviewed. No pertinent family history.    Social History     Socioeconomic History   • Marital status:      Spouse name: Not on file   • Number of children: Not on file   • Years of education: Not on file   • Highest education level: Not on file   Occupational History   • Not on file   Social Needs   • Financial resource strain: Not on file   • Food insecurity     Worry: Not on file     Inability: Not on file   • Transportation needs     Medical: Not on file     Non-medical: Not on file   Tobacco Use   • Smoking status: Not on file   Substance and Sexual Activity   • Alcohol use: Not on file   • Drug use: Not on file   • Sexual activity: Not on file   Lifestyle   • Physical activity     Days per week: Not on file     Minutes per session: Not on file   • Stress: Not on file   Relationships   • Social connections     Talks on phone: Not on file     Gets together: Not on file     Attends Holiness service: Not on file     Active member of club or  organization: Not on file     Attends meetings of clubs or organizations: Not on file     Relationship status: Not on file   • Intimate partner violence     Fear of current or ex partner: Not on file     Emotionally abused: Not on file     Physically abused: Not on file     Forced sexual activity: Not on file   Other Topics Concern   • Not on file   Social History Narrative   • Not on file       Physical Exam  Vitals  /58   Pulse 68   Temp 37 °C (98.6 °F) (Temporal)   Resp 17   Ht 1.524 m (5')   Wt 108.9 kg (240 lb)   SpO2 93%   General: Well Developed, Well Nourished, no acute distress  HEENT: Normocephalic, atraumatic  Eyes: Anicteric, PERRLA, EOMI  Neck: Supple, nontender, no masses  Lungs: CTA, no wheezes or crackles  Heart: RRR, no murmurs, rubs or gallops  Abdomen: Soft, NT, ND  Pelvis: Stable to AP and Lateral Compression  Skin: Intact, no open wounds  Extremities: Right alvin pain and deformity  Neuro: NVI  Vascular: 2+DP/PT, Capillary refill <2 seconds    Radiographs:  DX-ANKLE 2- VIEWS RIGHT   Final Result      Improved alignment of ankle subluxation and trimalleolar fracture status post closed reduction and splinting.            DX-CHEST-PORTABLE (1 VIEW)   Final Result      Cardiac silhouette enlargement.            DX-ANKLE 2- VIEWS RIGHT   Final Result      Mildly improved alignment of ankle subluxation and trimalleolar fracture status post closed reduction.         DX-ANKLE 3+ VIEWS RIGHT   Final Result      Trimalleolar fracture with ankle subluxation.             Laboratory Values  Recent Labs     12/21/20  0613   WBC 10.3   RBC 4.74   HEMOGLOBIN 12.2   HEMATOCRIT 39.1   MCV 82.5   MCH 25.7*   MCHC 31.2*   RDW 47.8   PLATELETCT 248   MPV 9.4     Recent Labs     12/21/20  0613   SODIUM 131*   POTASSIUM 3.5*   CHLORIDE 93*   CO2 24   GLUCOSE 93   BUN 24*     Recent Labs     12/21/20  0613   INR 2.42*         Impression: Right ankle fracture dislocation    Plan:Operative intervention  Wednesday as she would like to wait. Risks and benefits of surgery were discussed which include but are not limited to bleeding, infection, neurovascular damage, malunion, nonunion, instability, limb length discrepancy, DVT, PE, MI, Stroke and death. They understand these risks and wish to proceed.

## 2020-12-23 ENCOUNTER — APPOINTMENT (OUTPATIENT)
Dept: RADIOLOGY | Facility: MEDICAL CENTER | Age: 75
DRG: 492 | End: 2020-12-23
Attending: ORTHOPAEDIC SURGERY
Payer: COMMERCIAL

## 2020-12-23 ENCOUNTER — ANESTHESIA EVENT (OUTPATIENT)
Dept: SURGERY | Facility: MEDICAL CENTER | Age: 75
DRG: 492 | End: 2020-12-23
Payer: COMMERCIAL

## 2020-12-23 ENCOUNTER — ANESTHESIA (OUTPATIENT)
Dept: SURGERY | Facility: MEDICAL CENTER | Age: 75
DRG: 492 | End: 2020-12-23
Payer: COMMERCIAL

## 2020-12-23 LAB
ANION GAP SERPL CALC-SCNC: 7 MMOL/L (ref 7–16)
BASOPHILS # BLD AUTO: 0.1 % (ref 0–1.8)
BASOPHILS # BLD: 0.02 K/UL (ref 0–0.12)
BUN SERPL-MCNC: 39 MG/DL (ref 8–22)
CALCIUM SERPL-MCNC: 9.6 MG/DL (ref 8.5–10.5)
CHLORIDE SERPL-SCNC: 97 MMOL/L (ref 96–112)
CO2 SERPL-SCNC: 25 MMOL/L (ref 20–33)
CREAT SERPL-MCNC: 1.04 MG/DL (ref 0.5–1.4)
EOSINOPHIL # BLD AUTO: 0 K/UL (ref 0–0.51)
EOSINOPHIL NFR BLD: 0 % (ref 0–6.9)
ERYTHROCYTE [DISTWIDTH] IN BLOOD BY AUTOMATED COUNT: 47.6 FL (ref 35.9–50)
GLUCOSE SERPL-MCNC: 134 MG/DL (ref 65–99)
HCT VFR BLD AUTO: 37.6 % (ref 37–47)
HGB BLD-MCNC: 11.4 G/DL (ref 12–16)
IMM GRANULOCYTES # BLD AUTO: 0.16 K/UL (ref 0–0.11)
IMM GRANULOCYTES NFR BLD AUTO: 1.1 % (ref 0–0.9)
LYMPHOCYTES # BLD AUTO: 0.94 K/UL (ref 1–4.8)
LYMPHOCYTES NFR BLD: 6.7 % (ref 22–41)
MCH RBC QN AUTO: 25.3 PG (ref 27–33)
MCHC RBC AUTO-ENTMCNC: 30.3 G/DL (ref 33.6–35)
MCV RBC AUTO: 83.4 FL (ref 81.4–97.8)
MONOCYTES # BLD AUTO: 0.99 K/UL (ref 0–0.85)
MONOCYTES NFR BLD AUTO: 7.1 % (ref 0–13.4)
NEUTROPHILS # BLD AUTO: 11.93 K/UL (ref 2–7.15)
NEUTROPHILS NFR BLD: 85 % (ref 44–72)
NRBC # BLD AUTO: 0 K/UL
NRBC BLD-RTO: 0 /100 WBC
PLATELET # BLD AUTO: 301 K/UL (ref 164–446)
PMV BLD AUTO: 10.6 FL (ref 9–12.9)
POTASSIUM SERPL-SCNC: 4.2 MMOL/L (ref 3.6–5.5)
RBC # BLD AUTO: 4.51 M/UL (ref 4.2–5.4)
SODIUM SERPL-SCNC: 129 MMOL/L (ref 135–145)
WBC # BLD AUTO: 14 K/UL (ref 4.8–10.8)

## 2020-12-23 PROCEDURE — 770020 HCHG ROOM/CARE - TELE (206)

## 2020-12-23 PROCEDURE — 700102 HCHG RX REV CODE 250 W/ 637 OVERRIDE(OP)

## 2020-12-23 PROCEDURE — 500054 HCHG BANDAGE, ELASTIC 6: Performed by: ORTHOPAEDIC SURGERY

## 2020-12-23 PROCEDURE — A6454 SELF-ADHER BAND W>=3" <5"/YD: HCPCS | Performed by: ORTHOPAEDIC SURGERY

## 2020-12-23 PROCEDURE — A9270 NON-COVERED ITEM OR SERVICE: HCPCS | Performed by: STUDENT IN AN ORGANIZED HEALTH CARE EDUCATION/TRAINING PROGRAM

## 2020-12-23 PROCEDURE — C1713 ANCHOR/SCREW BN/BN,TIS/BN: HCPCS | Performed by: ORTHOPAEDIC SURGERY

## 2020-12-23 PROCEDURE — 700111 HCHG RX REV CODE 636 W/ 250 OVERRIDE (IP): Performed by: ORTHOPAEDIC SURGERY

## 2020-12-23 PROCEDURE — 0SSF0ZZ REPOSITION RIGHT ANKLE JOINT, OPEN APPROACH: ICD-10-PCS | Performed by: ORTHOPAEDIC SURGERY

## 2020-12-23 PROCEDURE — 0QSG04Z REPOSITION RIGHT TIBIA WITH INTERNAL FIXATION DEVICE, OPEN APPROACH: ICD-10-PCS | Performed by: ORTHOPAEDIC SURGERY

## 2020-12-23 PROCEDURE — 700102 HCHG RX REV CODE 250 W/ 637 OVERRIDE(OP): Performed by: STUDENT IN AN ORGANIZED HEALTH CARE EDUCATION/TRAINING PROGRAM

## 2020-12-23 PROCEDURE — 501838 HCHG SUTURE GENERAL: Performed by: ORTHOPAEDIC SURGERY

## 2020-12-23 PROCEDURE — A9270 NON-COVERED ITEM OR SERVICE: HCPCS | Performed by: NURSE PRACTITIONER

## 2020-12-23 PROCEDURE — 700105 HCHG RX REV CODE 258: Performed by: STUDENT IN AN ORGANIZED HEALTH CARE EDUCATION/TRAINING PROGRAM

## 2020-12-23 PROCEDURE — 700111 HCHG RX REV CODE 636 W/ 250 OVERRIDE (IP)

## 2020-12-23 PROCEDURE — 80048 BASIC METABOLIC PNL TOTAL CA: CPT

## 2020-12-23 PROCEDURE — 700102 HCHG RX REV CODE 250 W/ 637 OVERRIDE(OP): Performed by: NURSE PRACTITIONER

## 2020-12-23 PROCEDURE — 36415 COLL VENOUS BLD VENIPUNCTURE: CPT

## 2020-12-23 PROCEDURE — A9270 NON-COVERED ITEM OR SERVICE: HCPCS

## 2020-12-23 PROCEDURE — 700101 HCHG RX REV CODE 250

## 2020-12-23 PROCEDURE — 99232 SBSQ HOSP IP/OBS MODERATE 35: CPT | Performed by: STUDENT IN AN ORGANIZED HEALTH CARE EDUCATION/TRAINING PROGRAM

## 2020-12-23 PROCEDURE — 160048 HCHG OR STATISTICAL LEVEL 1-5: Performed by: ORTHOPAEDIC SURGERY

## 2020-12-23 PROCEDURE — 160035 HCHG PACU - 1ST 60 MINS PHASE I: Performed by: ORTHOPAEDIC SURGERY

## 2020-12-23 PROCEDURE — 160009 HCHG ANES TIME/MIN: Performed by: ORTHOPAEDIC SURGERY

## 2020-12-23 PROCEDURE — 85025 COMPLETE CBC W/AUTO DIFF WBC: CPT

## 2020-12-23 PROCEDURE — 500881 HCHG PACK, EXTREMITY: Performed by: ORTHOPAEDIC SURGERY

## 2020-12-23 PROCEDURE — 160029 HCHG SURGERY MINUTES - 1ST 30 MINS LEVEL 4: Performed by: ORTHOPAEDIC SURGERY

## 2020-12-23 PROCEDURE — 0QSJ04Z REPOSITION RIGHT FIBULA WITH INTERNAL FIXATION DEVICE, OPEN APPROACH: ICD-10-PCS | Performed by: ORTHOPAEDIC SURGERY

## 2020-12-23 PROCEDURE — 73600 X-RAY EXAM OF ANKLE: CPT | Mod: RT

## 2020-12-23 PROCEDURE — 160041 HCHG SURGERY MINUTES - EA ADDL 1 MIN LEVEL 4: Performed by: ORTHOPAEDIC SURGERY

## 2020-12-23 PROCEDURE — 160002 HCHG RECOVERY MINUTES (STAT): Performed by: ORTHOPAEDIC SURGERY

## 2020-12-23 DEVICE — PLATE DISTAL FIBULA 7H (2TX2+2TX1=6): Type: IMPLANTABLE DEVICE | Site: ANKLE | Status: FUNCTIONAL

## 2020-12-23 DEVICE — SCREW 3.5 MM NON-LOCKING TI X 10MM LONG (6TX8+2TX5=58): Type: IMPLANTABLE DEVICE | Site: ANKLE | Status: FUNCTIONAL

## 2020-12-23 DEVICE — SCREW 2.5 MM LOCKING TI X 10MM LONG (6TX8=48): Type: IMPLANTABLE DEVICE | Site: ANKLE | Status: FUNCTIONAL

## 2020-12-23 DEVICE — SCREW 3.5 MM NON-LOCKING TI X 45MM LONG (6TX8=48): Type: IMPLANTABLE DEVICE | Site: ANKLE | Status: FUNCTIONAL

## 2020-12-23 DEVICE — SCREW 3.5 MM NON-LOCKING TI X 12MM LONG (6TX8+2TX5=58): Type: IMPLANTABLE DEVICE | Site: ANKLE | Status: FUNCTIONAL

## 2020-12-23 DEVICE — SCREW 3.5 MM NON-LOCKING TI X 40MM LONG (6TX8=48): Type: IMPLANTABLE DEVICE | Site: ANKLE | Status: FUNCTIONAL

## 2020-12-23 DEVICE — SCREW 3.5 MM LOCKING TI X 12MM LONG (6TX8+2TX5=58): Type: IMPLANTABLE DEVICE | Site: ANKLE | Status: FUNCTIONAL

## 2020-12-23 RX ORDER — DEXAMETHASONE SODIUM PHOSPHATE 4 MG/ML
INJECTION, SOLUTION INTRA-ARTICULAR; INTRALESIONAL; INTRAMUSCULAR; INTRAVENOUS; SOFT TISSUE PRN
Status: DISCONTINUED | OUTPATIENT
Start: 2020-12-23 | End: 2020-12-23 | Stop reason: SURG

## 2020-12-23 RX ORDER — CEFAZOLIN SODIUM 2 G/100ML
2 INJECTION, SOLUTION INTRAVENOUS EVERY 8 HOURS
Status: COMPLETED | OUTPATIENT
Start: 2020-12-23 | End: 2020-12-24

## 2020-12-23 RX ORDER — BISACODYL 10 MG
10 SUPPOSITORY, RECTAL RECTAL
Status: DISCONTINUED | OUTPATIENT
Start: 2020-12-23 | End: 2020-12-31 | Stop reason: HOSPADM

## 2020-12-23 RX ORDER — AMOXICILLIN 250 MG
1 CAPSULE ORAL
Status: DISCONTINUED | OUTPATIENT
Start: 2020-12-23 | End: 2020-12-31 | Stop reason: HOSPADM

## 2020-12-23 RX ORDER — LABETALOL HYDROCHLORIDE 5 MG/ML
INJECTION, SOLUTION INTRAVENOUS PRN
Status: DISCONTINUED | OUTPATIENT
Start: 2020-12-23 | End: 2020-12-23 | Stop reason: SURG

## 2020-12-23 RX ORDER — ONDANSETRON 2 MG/ML
4 INJECTION INTRAMUSCULAR; INTRAVENOUS EVERY 4 HOURS PRN
Status: DISCONTINUED | OUTPATIENT
Start: 2020-12-23 | End: 2020-12-31 | Stop reason: HOSPADM

## 2020-12-23 RX ORDER — DIPHENHYDRAMINE HYDROCHLORIDE 50 MG/ML
12.5 INJECTION INTRAMUSCULAR; INTRAVENOUS
Status: DISCONTINUED | OUTPATIENT
Start: 2020-12-23 | End: 2020-12-23 | Stop reason: HOSPADM

## 2020-12-23 RX ORDER — SODIUM CHLORIDE 1 G/1
1 TABLET ORAL
Status: DISCONTINUED | OUTPATIENT
Start: 2020-12-23 | End: 2020-12-31 | Stop reason: HOSPADM

## 2020-12-23 RX ORDER — HEPARIN SODIUM 5000 [USP'U]/ML
5000 INJECTION, SOLUTION INTRAVENOUS; SUBCUTANEOUS EVERY 8 HOURS
Status: DISCONTINUED | OUTPATIENT
Start: 2020-12-23 | End: 2020-12-23

## 2020-12-23 RX ORDER — ONDANSETRON 2 MG/ML
4 INJECTION INTRAMUSCULAR; INTRAVENOUS
Status: DISCONTINUED | OUTPATIENT
Start: 2020-12-23 | End: 2020-12-23 | Stop reason: HOSPADM

## 2020-12-23 RX ORDER — POLYETHYLENE GLYCOL 3350 17 G/17G
1 POWDER, FOR SOLUTION ORAL 2 TIMES DAILY PRN
Status: DISCONTINUED | OUTPATIENT
Start: 2020-12-23 | End: 2020-12-31 | Stop reason: HOSPADM

## 2020-12-23 RX ORDER — OXYCODONE HYDROCHLORIDE 5 MG/1
5 TABLET ORAL
Status: DISCONTINUED | OUTPATIENT
Start: 2020-12-23 | End: 2020-12-26

## 2020-12-23 RX ORDER — BUPIVACAINE HYDROCHLORIDE 2.5 MG/ML
INJECTION, SOLUTION EPIDURAL; INFILTRATION; INTRACAUDAL
Status: DISCONTINUED | OUTPATIENT
Start: 2020-12-23 | End: 2020-12-23 | Stop reason: HOSPADM

## 2020-12-23 RX ORDER — KETOROLAC TROMETHAMINE 30 MG/ML
15 INJECTION, SOLUTION INTRAMUSCULAR; INTRAVENOUS EVERY 6 HOURS
Status: DISCONTINUED | OUTPATIENT
Start: 2020-12-23 | End: 2020-12-24

## 2020-12-23 RX ORDER — ACETAMINOPHEN 325 MG/1
650 TABLET ORAL EVERY 6 HOURS
Status: DISPENSED | OUTPATIENT
Start: 2020-12-24 | End: 2020-12-28

## 2020-12-23 RX ORDER — ONDANSETRON 2 MG/ML
INJECTION INTRAMUSCULAR; INTRAVENOUS PRN
Status: DISCONTINUED | OUTPATIENT
Start: 2020-12-23 | End: 2020-12-23 | Stop reason: SURG

## 2020-12-23 RX ORDER — OXYCODONE HYDROCHLORIDE 10 MG/1
10 TABLET ORAL
Status: DISCONTINUED | OUTPATIENT
Start: 2020-12-23 | End: 2020-12-26

## 2020-12-23 RX ORDER — AMOXICILLIN 250 MG
1 CAPSULE ORAL NIGHTLY
Status: DISCONTINUED | OUTPATIENT
Start: 2020-12-23 | End: 2020-12-31 | Stop reason: HOSPADM

## 2020-12-23 RX ORDER — SCOLOPAMINE TRANSDERMAL SYSTEM 1 MG/1
1 PATCH, EXTENDED RELEASE TRANSDERMAL
Status: DISCONTINUED | OUTPATIENT
Start: 2020-12-23 | End: 2020-12-31 | Stop reason: HOSPADM

## 2020-12-23 RX ORDER — OXYCODONE HYDROCHLORIDE AND ACETAMINOPHEN 5; 325 MG/1; MG/1
2 TABLET ORAL
Status: COMPLETED | OUTPATIENT
Start: 2020-12-23 | End: 2020-12-23

## 2020-12-23 RX ORDER — DIPHENHYDRAMINE HYDROCHLORIDE 50 MG/ML
25 INJECTION INTRAMUSCULAR; INTRAVENOUS EVERY 6 HOURS PRN
Status: DISCONTINUED | OUTPATIENT
Start: 2020-12-23 | End: 2020-12-31 | Stop reason: HOSPADM

## 2020-12-23 RX ORDER — HALOPERIDOL 5 MG/ML
1 INJECTION INTRAMUSCULAR EVERY 6 HOURS PRN
Status: DISCONTINUED | OUTPATIENT
Start: 2020-12-23 | End: 2020-12-31 | Stop reason: HOSPADM

## 2020-12-23 RX ORDER — DEXAMETHASONE SODIUM PHOSPHATE 4 MG/ML
4 INJECTION, SOLUTION INTRA-ARTICULAR; INTRALESIONAL; INTRAMUSCULAR; INTRAVENOUS; SOFT TISSUE
Status: DISCONTINUED | OUTPATIENT
Start: 2020-12-23 | End: 2020-12-24

## 2020-12-23 RX ORDER — SODIUM CHLORIDE 1 G/1
1 TABLET ORAL
Status: DISCONTINUED | OUTPATIENT
Start: 2020-12-23 | End: 2020-12-23

## 2020-12-23 RX ORDER — DOCUSATE SODIUM 100 MG/1
100 CAPSULE, LIQUID FILLED ORAL 2 TIMES DAILY
Status: DISCONTINUED | OUTPATIENT
Start: 2020-12-23 | End: 2020-12-31 | Stop reason: HOSPADM

## 2020-12-23 RX ORDER — HYDROMORPHONE HYDROCHLORIDE 1 MG/ML
0.5 INJECTION, SOLUTION INTRAMUSCULAR; INTRAVENOUS; SUBCUTANEOUS
Status: DISCONTINUED | OUTPATIENT
Start: 2020-12-23 | End: 2020-12-24

## 2020-12-23 RX ORDER — OXYCODONE HYDROCHLORIDE AND ACETAMINOPHEN 5; 325 MG/1; MG/1
1 TABLET ORAL
Status: COMPLETED | OUTPATIENT
Start: 2020-12-23 | End: 2020-12-23

## 2020-12-23 RX ORDER — ENEMA 19; 7 G/133ML; G/133ML
1 ENEMA RECTAL
Status: DISCONTINUED | OUTPATIENT
Start: 2020-12-23 | End: 2020-12-31 | Stop reason: HOSPADM

## 2020-12-23 RX ORDER — CEFAZOLIN SODIUM 1 G/3ML
INJECTION, POWDER, FOR SOLUTION INTRAMUSCULAR; INTRAVENOUS PRN
Status: DISCONTINUED | OUTPATIENT
Start: 2020-12-23 | End: 2020-12-23 | Stop reason: SURG

## 2020-12-23 RX ORDER — HALOPERIDOL 5 MG/ML
1 INJECTION INTRAMUSCULAR
Status: DISCONTINUED | OUTPATIENT
Start: 2020-12-23 | End: 2020-12-23 | Stop reason: HOSPADM

## 2020-12-23 RX ADMIN — SUGAMMADEX 200 MG: 100 INJECTION, SOLUTION INTRAVENOUS at 13:01

## 2020-12-23 RX ADMIN — FENTANYL CITRATE 100 MCG: 50 INJECTION, SOLUTION INTRAMUSCULAR; INTRAVENOUS at 12:20

## 2020-12-23 RX ADMIN — ROCURONIUM BROMIDE 40 MG: 10 INJECTION, SOLUTION INTRAVENOUS at 12:22

## 2020-12-23 RX ADMIN — DEXAMETHASONE SODIUM PHOSPHATE 8 MG: 4 INJECTION, SOLUTION INTRA-ARTICULAR; INTRALESIONAL; INTRAMUSCULAR; INTRAVENOUS; SOFT TISSUE at 12:25

## 2020-12-23 RX ADMIN — SODIUM CHLORIDE: 9 INJECTION, SOLUTION INTRAVENOUS at 12:20

## 2020-12-23 RX ADMIN — CEFAZOLIN SODIUM 2 G: 2 INJECTION, SOLUTION INTRAVENOUS at 21:53

## 2020-12-23 RX ADMIN — CEFAZOLIN 2 G: 330 INJECTION, POWDER, FOR SOLUTION INTRAMUSCULAR; INTRAVENOUS at 12:20

## 2020-12-23 RX ADMIN — Medication 1 G: at 21:45

## 2020-12-23 RX ADMIN — DOCUSATE SODIUM 50 MG AND SENNOSIDES 8.6 MG 2 TABLET: 8.6; 5 TABLET, FILM COATED ORAL at 18:25

## 2020-12-23 RX ADMIN — ATORVASTATIN CALCIUM 20 MG: 20 TABLET, FILM COATED ORAL at 18:25

## 2020-12-23 RX ADMIN — FENTANYL CITRATE 25 MCG: 50 INJECTION, SOLUTION INTRAMUSCULAR; INTRAVENOUS at 13:32

## 2020-12-23 RX ADMIN — DEXAMETHASONE 6 MG: 4 TABLET ORAL at 06:06

## 2020-12-23 RX ADMIN — LABETALOL HYDROCHLORIDE 10 MG: 5 INJECTION, SOLUTION INTRAVENOUS at 13:07

## 2020-12-23 RX ADMIN — OXYCODONE HYDROCHLORIDE AND ACETAMINOPHEN 2 TABLET: 5; 325 TABLET ORAL at 13:36

## 2020-12-23 RX ADMIN — PROPOFOL 150 MG: 10 INJECTION, EMULSION INTRAVENOUS at 12:20

## 2020-12-23 RX ADMIN — AMIODARONE HYDROCHLORIDE 200 MG: 200 TABLET ORAL at 06:06

## 2020-12-23 RX ADMIN — ONDANSETRON 8 MG: 2 INJECTION INTRAMUSCULAR; INTRAVENOUS at 12:24

## 2020-12-23 ASSESSMENT — ENCOUNTER SYMPTOMS
VOMITING: 0
COUGH: 0
FALLS: 1
HEMOPTYSIS: 0
NAUSEA: 0
MYALGIAS: 0
WEAKNESS: 1
FEVER: 0
PALPITATIONS: 0
BLURRED VISION: 0
BRUISES/BLEEDS EASILY: 0
CHILLS: 0
DEPRESSION: 0
FOCAL WEAKNESS: 0

## 2020-12-23 ASSESSMENT — PAIN DESCRIPTION - PAIN TYPE
TYPE: ACUTE PAIN
TYPE: SURGICAL PAIN

## 2020-12-23 NOTE — CARE PLAN
Problem: Communication  Goal: The ability to communicate needs accurately and effectively will improve  Outcome: PROGRESSING AS EXPECTED     Problem: Safety  Goal: Will remain free from injury  Outcome: PROGRESSING AS EXPECTED     Problem: Infection  Goal: Will remain free from infection  Outcome: PROGRESSING AS EXPECTED     Problem: Venous Thromboembolism (VTW)/Deep Vein Thrombosis (DVT) Prevention:  Goal: Patient will participate in Venous Thrombosis (VTE)/Deep Vein Thrombosis (DVT)Prevention Measures  Outcome: PROGRESSING AS EXPECTED     Problem: Bowel/Gastric:  Goal: Normal bowel function is maintained or improved  Outcome: PROGRESSING AS EXPECTED     Problem: Knowledge Deficit  Goal: Knowledge of disease process/condition, treatment plan, diagnostic tests, and medications will improve  Outcome: PROGRESSING AS EXPECTED     Problem: Discharge Barriers/Planning  Goal: Patient's continuum of care needs will be met  Outcome: PROGRESSING AS EXPECTED     Problem: Pain Management  Goal: Pain level will decrease to patient's comfort goal  Outcome: PROGRESSING AS EXPECTED     Problem: Mobility  Goal: Risk for activity intolerance will decrease  Outcome: PROGRESSING AS EXPECTED

## 2020-12-23 NOTE — ANESTHESIA QCDR
2019 [facility] Clinical Data Registry (for Quality Improvement)     Postoperative nausea/vomiting risk protocol (Adult = 18 yrs and Pediatric 3-17 yrs)- (430 and 463)  General inhalation anesthetic (NOT TIVA) with PONV risk factors: Yes  Provision of anti-emetic therapy with at least 2 different classes of agents: Yes   Patient DID NOT receive anti-emetic therapy and reason is documented in Medical Record:  N/A    Multimodal Pain Management- (477)  Non-emergent surgery AND patient age >= 18: Yes  Use of Multimodal Pain Management, two or more drugs and/or interventions, NOT including systemic opioids:   Exception: Documented allergy to multiple classes of analgesics:     Smoking Abstinence (404)  Patient is current smoker (cigarette, pipe, e-cig, marijuanna):   Elective Surgery:   Abstinence instructions provided prior to day of surgery:   Patient abstained from smoking on day of surgery:     Pre-Op Beta-Blocker in Isolated CABG (44)  Isolated CABG AND patient age >= 18:   Beta-blocker admin within 24 hours of surgical incision:   Exception:of medical reason(s) for not administering beta blocker within 24 hours prior to surgical incision (e.g., not  indicated,other medical reason):     PACU assessment of acute postoperative pain prior to Anesthesia Care End- Applies to Patients Age = 18- (ABG7)  Initial PACU pain score is which of the following: < 7/10  Patient unable to report pain score: N/A    Post-anesthetic transfer of care checklist/protocol to PACU/ICU- (426 and 427)  Upon conclusion of case, patient transferred to which of the following locations: PACU/Non-ICU  Use of transfer checklist/protocol: Yes  Exclusion: Service Performed in Patient Hospital Room (and thus did not require transfer): N/A  Unplanned admission to ICU related to anesthesia service up through end of PACU care- (MD51)  Unplanned admission to ICU (not initially anticipated at anesthesia start time): No

## 2020-12-23 NOTE — ANESTHESIA PREPROCEDURE EVALUATION
Relevant Problems   CARDIAC   (+) SVT (supraventricular tachycardia) ([facility])         (+) MAT (acute kidney injury) ([facility])      Other   (+) COVID-19   (+) Chronic anticoagulation   (+) Hyponatremia   (+) Morbid obesity (HCC)       Physical Exam    Airway   Mallampati: II  TM distance: >3 FB  Neck ROM: full       Cardiovascular - normal exam  Rhythm: regular  Rate: normal  (-) murmur     Dental - normal exam           Pulmonary - normal exam  Breath sounds clear to auscultation     Abdominal    Neurological - normal exam                 Anesthesia Plan    ASA 4       Plan - general       Airway plan will be ETT        Induction: intravenous    Postoperative Plan: Postoperative administration of opioids is intended.    Pertinent diagnostic labs and testing reviewed    Informed Consent:    Anesthetic plan and risks discussed with patient.    Use of blood products discussed with: patient whom consented to blood products.

## 2020-12-23 NOTE — OP REPORT
DATE OF SERVICE:  12/23/2020     PREOPERATIVE DIAGNOSES:  1.  Right bimalleolar ankle fracture.  2.  Right ankle syndesmotic disruption.     POSTOPERATIVE DIAGNOSES:  1.  Right bimalleolar ankle fracture.  2.  Right ankle syndesmotic disruption.     PROCEDURES PERFORMED:  1.  Open reduction and internal fixation of right bimalleolar ankle fracture.  2.  Open reduction and internal fixation of syndesmosis.     SURGEON:  Thor Robles MD     ASSISTANT:  Wilver Villa PA-C     ESTIMATED BLOOD LOSS:  Minimal.     INDICATIONS:  This is a 75-year-old female status post fall who sustained a   right ankle fracture dislocation.  Risks and benefits of operative fixation   were discussed, which include but not limited to bleeding, infection,   neurovascular damage, pain, stiffness, malunion, nonunion, DVT, PE, MI,   stroke, and death.  They understand all these risks and wished to proceed.     DESCRIPTION OF PROCEDURE:  The patient was sedated with LMA anesthesia and   administered perioperative antibiotics.  The right lower extremity was prepped   and draped in the usual sterile fashion.  Standard lateral approach to the   fibula was performed with care taken to avoid all neurovascular structures.    The comminuted fibular fracture was reduced in anatomic position and held with   [facility] distal fibular locking plate with combination of locking and nonlocking   fixation.  The posterior malleolus reduced well, but the syndesmosis remained   wide.  As a result, the syndesmosis was stressed and clamped under direct   vision and fluoroscopic guidance and held with two syndesmotic screws.  Good   reduction was achieved.  Wounds were irrigated, closed in layers.  Sterile   dressing was applied.  Posterior splint was applied.  The patient tolerated   the procedure well.     POSTOPERATIVE PLAN:  The patient will be admitted for care of her medical   issues, perioperative antibiotics, pain control and DVT  prophylaxis.        ______________________________  MANUEL FARMER MD PLA/SUSY    DD:  12/23/2020 12:58  DT:  12/23/2020 14:44    Job#:  [id number]

## 2020-12-23 NOTE — OR NURSING
Patient recovered well in post-op. AAOx4. VSS, on 4L via NC, communicated with bedside RN to initiate ERAS oxymask 10L on floor. Surgical sites DALJIT, surgical dressing in place, CDI. Surgical pain managed through PO and IV medications. Patient able to intake fluids without nausea. Patient declined family/friend update at this time. No patient belongings in recovery. Report called to MACK Jerez. Patient transported with RN to floor.

## 2020-12-23 NOTE — ANESTHESIA POSTPROCEDURE EVALUATION
Patient: Linda Goodwin    Procedure Summary     Date: 12/23/20 Room / Location: [facility] 03 / SURGERY [facility]    Anesthesia Start: 1220 Anesthesia Stop: 1310    Procedure: ORIF, ANKLE (Right Ankle) Diagnosis: (bimalleolar ankle fracture)    Surgeons: Thor Robles M.D. Responsible Provider: Lino Baumann M.D.    Anesthesia Type: general ASA Status: 4          Final Anesthesia Type: general  Last vitals  BP   Blood Pressure : 122/60    Temp   36.5 °C (97.7 °F)    Pulse   Pulse: 69   Resp   20    SpO2   91 %      Anesthesia Post Evaluation    Patient location during evaluation: PACU  Patient participation: complete - patient participated  Level of consciousness: awake and alert    Airway patency: patent  Anesthetic complications: no  Cardiovascular status: hemodynamically stable  Respiratory status: acceptable  Hydration status: euvolemic    PONV: none           Nurse Pain Score: 5 (NPRS)

## 2020-12-23 NOTE — PROGRESS NOTES
Patient seen and examined  Agitated c/o poor sleep and lack of specifics regarding her surgery and potential issues with anesthesia    /60   Pulse 69   Temp 36.5 °C (97.7 °F) (Temporal)   Resp 20   Ht 1.524 m (5')   Wt 108.6 kg (239 lb 6.4 oz)   SpO2 91%     Recent Labs     12/21/20  0613 12/22/20  0521   WBC 10.3 12.3*   RBC 4.74 4.48   HEMOGLOBIN 12.2 11.4*   HEMATOCRIT 39.1 36.5*   MCV 82.5 81.5   MCH 25.7* 25.4*   MCHC 31.2* 31.2*   RDW 47.8 46.7   PLATELETCT 248 279   MPV 9.4 10.3     Makes very little if any eye contact  Agitated   Dressing clean dry and intact  Neurovascularly intact    Right ankle fracture dislocation    Plan:  Patient states was made aware that surgical intervention of her ankle was required at the time of her admission. I have spoken to her at length regarding specifics of the procedure and she states all of her questions have been answered. She understands that non-operative treatment could result in permanent disability. She wants to speak with a family member who is a CNA in [place] prior to making a decision. I have offered to stay and speak with family which is declined. R/w judy.

## 2020-12-23 NOTE — ANESTHESIA PROCEDURE NOTES
Airway    Date/Time: 12/23/2020 12:22 PM  Performed by: Lino Baumann M.D.  Authorized by: Lino Baumann M.D.     Location:  OR  Urgency:  Elective  Indications for Airway Management:  Anesthesia      Spontaneous Ventilation: absent    Sedation Level:  Deep  Preoxygenated: Yes    Patient Position:  Sniffing  Final Airway Type:  Endotracheal airway  Final Endotracheal Airway:  ETT  Cuffed: Yes    Technique Used for Successful ETT Placement:  Video laryngoscopy    Insertion Site:  Oral  Blade Type:  [place]  Laryngoscope Blade/Videolaryngoscope Blade Size:  3  ETT Size (mm):  7.5  Measured from:  Teeth  ETT to Teeth (cm):  22  Placement Verified by: auscultation and capnometry    Cormack-Lehane Classification:  Grade I - full view of glottis  Number of Attempts at Approach:  1

## 2020-12-23 NOTE — PROGRESS NOTES
Pt received in bed, A&Ox4, no sign of acute distress noted, resp equal, even and unlabored on NC. VSS. NPO status maintained, pending  status maintained pending ORIF. RLE dressing CDI, cap refill and pulse present. Pt voices concerns about the pre op questions which were address earlier by MD at the bedside. Pt denied concerns, pain at this time and is just awaiting for surgery. Fall, safety and Isolation precautions maintained. Close monitoring in progress.

## 2020-12-23 NOTE — PROGRESS NOTES
Received bedside report from RN, pt care assumed, VSS, pt assessment complete. Pt AAOx4, c/o 5/10 pain at this time. No signs of acute distress noted at this time. POC discussed with pt and verbalizes no questions. Pt denies any additional needs at this time. Bed in lowest position, pt educated on fall risk and verbalized understanding, call light within reach, hourly rounding initiated.

## 2020-12-23 NOTE — PROGRESS NOTES
"Surgery was scheduled for 0830 but patient is frustrated and concerned. She states \"I have not had any of my questions answered and I don't know what they plan on doing.\" Called pre-op back and let them know patients concerns, will F/U with surgeon  "

## 2020-12-23 NOTE — PROGRESS NOTES
[facility] Medicine Daily Progress Note    Date of Service  12/22/2020    Chief Complaint  75 y.o. female admitted 12/21/2020 with   Chief Complaint   Patient presents with   • Ankle Pain     pt felt dizzy after using restroom, stood up, GLF, unable to get up r/t R ankle pain         Hospital Course  No notes on file   75 y.o. female who presented 12/21/2020 with history of morbid obesity, SVT on Xarelto and 3 falls over the last year resulting in fractures.  She presents with pain in her right ankle after ground-level fall at home.  Just prior to arrival she was sitting on the toilet, when she stood up she felt off balance when trying to pull up her pants.  She does not believe she lost consciousness and denies any head trauma.  She remembers her son coming to her and calling EMS.    In the emergency department she is found to have a trimalleolar fracture with subsequent reduction.  Orthopedic surgery is consulted recommending holding Xarelto and possibility of intervention. Plan ORIF ankle 12/23.    She was found to be hypoxic and was placed on supplemental oxygen.  Her chest x-ray was negative though her COVID PCR was positive. Decadron started on 12/21.    Interval Problem Update  Patient was seen and examined at the bedside. No overnight events reported.  VS reviewed.   Patient denies fever, chills, chest pain, shortness breath.  She is on 2 L nasal cannula.    Consultants/Specialty  orthopedics    Code Status  DNAR/DNI    Disposition  TBD    Review of Systems  Review of Systems   Constitutional: Positive for malaise/fatigue. Negative for chills and fever.   HENT: Negative for ear discharge.    Eyes: Negative for blurred vision.   Respiratory: Negative for cough and hemoptysis.    Cardiovascular: Negative for chest pain and palpitations.   Gastrointestinal: Negative for nausea and vomiting.   Genitourinary: Negative for dysuria.   Musculoskeletal: Positive for falls. Negative for myalgias.   Skin: Negative for  rash.   Neurological: Positive for weakness. Negative for focal weakness.   Endo/Heme/Allergies: Does not bruise/bleed easily.   Psychiatric/Behavioral: Negative for depression.        Physical Exam  Temp:  [36.3 °C (97.3 °F)-36.5 °C (97.7 °F)] 36.5 °C (97.7 °F)  Pulse:  [65-88] 74  Resp:  [17-18] 18  BP: (100-130)/(63-73) 125/63  SpO2:  [89 %-93 %] 91 %    Physical Exam  Vitals signs reviewed.   Constitutional:       Appearance: Normal appearance. She is obese.   HENT:      Head: Normocephalic and atraumatic.      Nose: Nose normal.      Mouth/Throat:      Pharynx: Oropharynx is clear.   Eyes:      Extraocular Movements: Extraocular movements intact.      Conjunctiva/sclera: Conjunctivae normal.      Pupils: Pupils are equal, round, and reactive to light.   Neck:      Musculoskeletal: Normal range of motion and neck supple.   Cardiovascular:      Rate and Rhythm: Normal rate and regular rhythm.      Pulses: Normal pulses.      Heart sounds: Normal heart sounds.   Pulmonary:      Effort: Pulmonary effort is normal.      Breath sounds: Normal breath sounds. No wheezing.      Comments: Oxygen supplements  Diminished bilateral breath sound  Abdominal:      General: Abdomen is flat. Bowel sounds are normal.      Palpations: Abdomen is soft.   Musculoskeletal: Normal range of motion.      Comments: Right lower extremity in splint   Skin:     General: Skin is warm and dry.   Neurological:      General: No focal deficit present.      Mental Status: She is alert and oriented to person, place, and time. Mental status is at baseline.   Psychiatric:         Mood and Affect: Mood normal.         Behavior: Behavior normal.         Fluids    Intake/Output Summary (Last 24 hours) at 12/22/2020 1718  Last data filed at 12/22/2020 0800  Gross per 24 hour   Intake 240 ml   Output --   Net 240 ml       Laboratory  Recent Labs     12/21/20  0613 12/22/20  0521   WBC 10.3 12.3*   RBC 4.74 4.48   HEMOGLOBIN 12.2 11.4*   HEMATOCRIT 39.1  36.5*   MCV 82.5 81.5   MCH 25.7* 25.4*   MCHC 31.2* 31.2*   RDW 47.8 46.7   PLATELETCT 248 279   MPV 9.4 10.3     Recent Labs     12/21/20  0613 12/22/20  0521   SODIUM 131* 129*   POTASSIUM 3.5* 4.1   CHLORIDE 93* 95*   CO2 24 23   GLUCOSE 93 120*   BUN 24* 38*   CREATININE 1.27 1.51*   CALCIUM 9.4 9.5     Recent Labs     12/21/20  0613   INR 2.42*               Imaging  DX-ANKLE 2- VIEWS RIGHT   Final Result      Improved alignment of ankle subluxation and trimalleolar fracture status post closed reduction and splinting.            DX-CHEST-PORTABLE (1 VIEW)   Final Result      Cardiac silhouette enlargement.            DX-ANKLE 2- VIEWS RIGHT   Final Result      Mildly improved alignment of ankle subluxation and trimalleolar fracture status post closed reduction.         DX-ANKLE 3+ VIEWS RIGHT   Final Result      Trimalleolar fracture with ankle subluxation.              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    Ortho consulted: plan surgery 12/23  Pain managements  PT/OT    Acute hypoxemic respiratory failure due to COVID-19 ([facility])  Assessment & Plan  Continue oxygen supplements, wean as tolerated  Start the Decadron 12/21  Supportive managements: RT protocol, judicious fluid, self prone    COVID-19  Assessment & Plan  Procalcitonin normal  D-dimer:3.66  No indication for antibiotics  Management as above    SVT (supraventricular tachycardia) ([facility])  Assessment & Plan  History of SVT  Continue amiodarone  Xarelto on hold due to surgery 12/23    MAT (acute kidney injury) ([facility])  Assessment & Plan  Unknown baseline creatinine  IV fluid  Continue monitoring      Hyponatremia  Assessment & Plan  Mild to moderate hyponatremia, asymptomatic  IV fluid to 1 L  continue monitoring    Leukocytosis  Assessment & Plan  Procalcitonin normal, likely secondary to steroid  Continue monitoring    Falls frequently  Assessment &  Plan  Associated with severe physical deconditioning.  Follow-up PT eval    Morbid obesity (HCC)  Assessment & Plan  diet and lifestyle modification recommended    Chronic anticoagulation  Assessment & Plan  On Xarelto for history of SVT.  Held for possibility of surgery       VTE prophylaxis: lovenox

## 2020-12-23 NOTE — ANESTHESIA TIME REPORT
Anesthesia Start and Stop Event Times     Date Time Event    12/23/2020 1220 Ready for Procedure     1220 Anesthesia Start     1310 Anesthesia Stop        Responsible Staff  12/23/20    Name Role Begin End    Lino Baumann M.D. Anesth 1220 1310        Preop Diagnosis (Free Text):  Pre-op Diagnosis     bimalleolar ankle fracture        Preop Diagnosis (Codes):    Post op Diagnosis  Bimalleolar ankle fracture      Premium Reason  Non-Premium    Comments:

## 2020-12-23 NOTE — PROGRESS NOTES
[facility] Medicine Daily Progress Note    Date of Service  12/23/2020    Chief Complaint  75 y.o. female admitted 12/21/2020 with   Chief Complaint   Patient presents with   • Ankle Pain     pt felt dizzy after using restroom, stood up, GLF, unable to get up r/t R ankle pain         Hospital Course    75 y.o. female who presented 12/21/2020 with history of morbid obesity, SVT on Xarelto and 3 falls over the last year resulting in fractures.  She presents with pain in her right ankle after ground-level fall at home.  Just prior to arrival she was sitting on the toilet, when she stood up she felt off balance when trying to pull up her pants.  She does not believe she lost consciousness and denies any head trauma.  She remembers her son coming to her and calling EMS.    In the emergency department she is found to have a trimalleolar fracture with subsequent reduction.  Orthopedic surgery is consulted recommending holding Xarelto and possibility of intervention. Plan ORIF ankle 12/23. However patient declined in the morning. Surgery is postponed.     She was found to be hypoxic and was placed on supplemental oxygen.  Her chest x-ray was negative though her COVID PCR was positive. Decadron started on 12/21.    Interval Problem Update  Patient was seen and examined at the bedside. No overnight events reported.  VS reviewed.   Patient denies fever, chills, chest pain, shortness breath.  She is on 3 L nasal cannula.  Patient was upset this morning as she was scheduled for surgery 8:30 AM.  However she said that she needs to talk to the orthopedic surgeon for questions. Surgery is postponed.    Consultants/Specialty  orthopedics    Code Status  DNAR/DNI    Disposition  TBD    Review of Systems  Review of Systems   Constitutional: Positive for malaise/fatigue. Negative for chills and fever.   HENT: Negative for ear discharge.    Eyes: Negative for blurred vision.   Respiratory: Negative for cough and hemoptysis.     Cardiovascular: Negative for chest pain and palpitations.   Gastrointestinal: Negative for nausea and vomiting.   Genitourinary: Negative for dysuria.   Musculoskeletal: Positive for falls. Negative for myalgias.   Skin: Negative for rash.   Neurological: Positive for weakness. Negative for focal weakness.   Endo/Heme/Allergies: Does not bruise/bleed easily.   Psychiatric/Behavioral: Negative for depression.        Physical Exam  Temp:  [36.5 °C (97.7 °F)-36.7 °C (98 °F)] 36.5 °C (97.7 °F)  Pulse:  [67-74] 69  Resp:  [18-20] 20  BP: ()/(55-76) 122/60  SpO2:  [89 %-92 %] 91 %    Physical Exam  Vitals signs reviewed.   Constitutional:       Appearance: Normal appearance. She is obese.   HENT:      Head: Normocephalic and atraumatic.      Nose: Nose normal.      Mouth/Throat:      Pharynx: Oropharynx is clear.   Eyes:      Extraocular Movements: Extraocular movements intact.      Conjunctiva/sclera: Conjunctivae normal.      Pupils: Pupils are equal, round, and reactive to light.   Neck:      Musculoskeletal: Normal range of motion and neck supple.   Cardiovascular:      Rate and Rhythm: Normal rate and regular rhythm.      Pulses: Normal pulses.      Heart sounds: Normal heart sounds.   Pulmonary:      Effort: Pulmonary effort is normal.      Breath sounds: Normal breath sounds. No wheezing.      Comments: Oxygen supplements  Diminished bilateral breath sound  Abdominal:      General: Abdomen is flat. Bowel sounds are normal.      Palpations: Abdomen is soft.   Musculoskeletal: Normal range of motion.      Comments: Right lower extremity in splint   Skin:     General: Skin is warm and dry.   Neurological:      General: No focal deficit present.      Mental Status: She is alert and oriented to person, place, and time. Mental status is at baseline.   Psychiatric:         Mood and Affect: Mood normal.         Behavior: Behavior normal.         Fluids    Intake/Output Summary (Last 24 hours) at 12/23/2020  1027  Last data filed at 12/22/2020 1300  Gross per 24 hour   Intake 240 ml   Output --   Net 240 ml       Laboratory  Recent Labs     12/21/20  0613 12/22/20  0521 12/23/20  0726   WBC 10.3 12.3* 14.0*   RBC 4.74 4.48 4.51   HEMOGLOBIN 12.2 11.4* 11.4*   HEMATOCRIT 39.1 36.5* 37.6   MCV 82.5 81.5 83.4   MCH 25.7* 25.4* 25.3*   MCHC 31.2* 31.2* 30.3*   RDW 47.8 46.7 47.6   PLATELETCT 248 279 301   MPV 9.4 10.3 10.6     Recent Labs     12/21/20  0613 12/22/20  0521 12/23/20  0726   SODIUM 131* 129* 129*   POTASSIUM 3.5* 4.1 4.2   CHLORIDE 93* 95* 97   CO2 24 23 25   GLUCOSE 93 120* 134*   BUN 24* 38* 39*   CREATININE 1.27 1.51* 1.04   CALCIUM 9.4 9.5 9.6     Recent Labs     12/21/20  0613   INR 2.42*               Imaging  DX-ANKLE 2- VIEWS RIGHT   Final Result      Improved alignment of ankle subluxation and trimalleolar fracture status post closed reduction and splinting.            DX-CHEST-PORTABLE (1 VIEW)   Final Result      Cardiac silhouette enlargement.            DX-ANKLE 2- VIEWS RIGHT   Final Result      Mildly improved alignment of ankle subluxation and trimalleolar fracture status post closed reduction.         DX-ANKLE 3+ VIEWS RIGHT   Final Result      Trimalleolar fracture with ankle subluxation.         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    Ortho consulted: plan surgery 12/23, however postponed as patient has concerns with surgery. Await ortho updated plans  Pain managements  PT/OT    Acute hypoxemic respiratory failure due to COVID-19 (HCC)  Assessment & Plan  Continue oxygen supplements, wean as tolerated  Start the Decadron 12/21  Supportive managements: RT protocol, judicious fluid, self prone    COVID-19  Assessment & Plan  Procalcitonin normal, No indication for antibiotics  D-dimer:3.66: No  leg pain, swelling, no tachycardia. Will hold on doppler u/s and CTA at this point,   Management as above    SVT (supraventricular tachycardia) ([facility])  Assessment & Plan  History of SVT  Continue amiodarone  Xarelto on hold due to surgery 12/23    MAT (acute kidney injury) ([facility])  Assessment & Plan  Resolved. Unknown baseline creatinine  IV fluid  Continue monitoring      Hyponatremia  Assessment & Plan  Mild to moderate hyponatremia, asymptomatic  IV fluid to 1 L  Add salt tabs  continue monitoring    Leukocytosis  Assessment & Plan  Procalcitonin normal, likely secondary to steroid  Continue monitoring    Falls frequently  Assessment & Plan  Associated with severe physical deconditioning.  Follow-up PT eval    Morbid obesity ([facility])  Assessment & Plan  diet and lifestyle modification recommended    Chronic anticoagulation  Assessment & Plan  On Xarelto for history of SVT.  Held for possibility of surgery       VTE prophylaxis: heparin

## 2020-12-23 NOTE — ASSESSMENT & PLAN NOTE
Continue oxygen supplements, wean as tolerated  Procalcitonin negative  Start the Decadron 12/21-12/30  Supportive managements: RT protocol, IS, judicious fluid, self prone  Start lasix 40mg iv daily, I/O, CXR follow up (ordered on 12/28)  Currently 2L nasal cannula

## 2020-12-23 NOTE — DISCHARGE PLANNING
Care Transition Team Assessment    LSW spoke with Pt over the phone to verify facesheet information and gather information for assessment. Pt reports that she lives with her son and prior to this ankle injury she was totally independent. She is concerned about her mobility as she doesn't think she can navigate on crutches due to previous injuries to her shoulder.     Pt has social security income and she has no financial concerns. She does have an DPOA but doesn't have a copy. Her granddaughter has the original. Her name is Darcy Goodwin and she is the POA. Pt doesn't have the number right now but her son Jeffrey should. Pt prefers to use the [facility] [facility] on [address]. She denied any history of mental illness or substance abuse. Her primary concern now is how she will move around after her ankle surgery. She was happy to hear that case management will help her coordinate and services he needs.      Information Source  Orientation : Oriented x 4  Information Given By: Patient  Who is responsible for making decisions for patient? : Patient    Readmission Evaluation  Is this a readmission?: No    Elopement Risk  Legal Hold: No    Interdisciplinary Discharge Planning  Primary Care Physician: Dr. Everardo Phillips  Lives with - Patient's Self Care Capacity: Adult Children  Support Systems: Children  Housing / Facility: [address] [facility]  Do You Take your Prescribed Medications Regularly: Yes  Mobility Issues: Yes  Prior Services: None  Assistance Needed: Unknown at this Time  Durable Medical Equipment: Not Applicable    Discharge Preparedness  What is your plan after discharge?: Home with help  What are your discharge supports?: Child  Prior Functional Level: Ambulatory, Drives Self, Independent with Activities of Daily Living, Independent with Medication Management  Difficulity with ADLs: None  Difficulity with IADLs: None    Functional Assesment  Prior Functional Level: Ambulatory, Drives Self, Independent with Activities of  Daily Living, Independent with Medication Management    Finances  Financial Barriers to Discharge: No  Prescription Coverage: Yes    Vision / Hearing Impairment  Right Eye Vision: Wears Glasses  Left Eye Vision: Wears Glasses         Advance Directive  Advance Directive?: None, DPOA for Health Care  Durable Power of  Name and Contact : Darcy Gilliam(Pt's [facility])    Domestic Abuse  Have you ever been the victim of abuse or violence?: No    Psychological Assessment  History of Substance Abuse: None  History of Psychiatric Problems: No  Non-compliant with Treatment: No  Newly Diagnosed Illness: No    Discharge Risks or Barriers  Discharge risks or barriers?: Transportation    Anticipated Discharge Information  Discharge Disposition: Still a Patient (30)  Discharge Address: ([address] [place] [id number])

## 2020-12-23 NOTE — CARE PLAN
Problem: Communication  Goal: The ability to communicate needs accurately and effectively will improve  12/23/2020 1215 by Meri Wahl R.N.  Outcome: PROGRESSING AS EXPECTED  12/23/2020 1008 by Meri Wahl R.N.  Outcome: PROGRESSING AS EXPECTED     Problem: Safety  Goal: Will remain free from injury  12/23/2020 1215 by Meri Wahl R.N.  Outcome: PROGRESSING AS EXPECTED  12/23/2020 1008 by Meri Wahl R.N.  Outcome: PROGRESSING AS EXPECTED     Problem: Infection  Goal: Will remain free from infection  12/23/2020 1215 by Meri Wahl R.N.  Outcome: PROGRESSING AS EXPECTED  12/23/2020 1008 by Meri Wahl R.N.  Outcome: PROGRESSING AS EXPECTED     Problem: Venous Thromboembolism (VTW)/Deep Vein Thrombosis (DVT) Prevention:  Goal: Patient will participate in Venous Thrombosis (VTE)/Deep Vein Thrombosis (DVT)Prevention Measures  12/23/2020 1215 by Meri Wahl R.N.  Outcome: PROGRESSING AS EXPECTED  12/23/2020 1008 by Meri Wahl R.N.  Outcome: PROGRESSING AS EXPECTED     Problem: Bowel/Gastric:  Goal: Normal bowel function is maintained or improved  12/23/2020 1215 by Meri Wahl R.N.  Outcome: PROGRESSING AS EXPECTED  12/23/2020 1008 by Meri Wahl R.N.  Outcome: PROGRESSING AS EXPECTED  Goal: Will not experience complications related to bowel motility  Outcome: PROGRESSING AS EXPECTED     Problem: Knowledge Deficit  Goal: Knowledge of disease process/condition, treatment plan, diagnostic tests, and medications will improve  Outcome: PROGRESSING AS EXPECTED     Problem: Discharge Barriers/Planning  Goal: Patient's continuum of care needs will be met  Outcome: PROGRESSING AS EXPECTED     Problem: Pain Management  Goal: Pain level will decrease to patient's comfort goal  12/23/2020 1215 by Meri Wahl R.N.  Outcome: PROGRESSING AS EXPECTED  12/23/2020 1008 by Meri Wahl R.N.  Outcome: PROGRESSING AS EXPECTED     Problem: Mobility  Goal: Risk for  activity intolerance will decrease  Outcome: PROGRESSING AS EXPECTED

## 2020-12-23 NOTE — PROGRESS NOTES
Pt returned from surgery in no distress, Sat 96 on 4 L. Denied pain and concerns at this time. Dressing on RLE CDI, circulation and cap refill present, pt is on full liquid diet. Close monitoring and VSS in progress.

## 2020-12-24 PROBLEM — I48.91 A-FIB (HCC): Status: ACTIVE | Noted: 2020-12-24

## 2020-12-24 PROBLEM — I47.10 SVT (SUPRAVENTRICULAR TACHYCARDIA) (HCC): Status: RESOLVED | Noted: 2020-12-22 | Resolved: 2020-12-24

## 2020-12-24 LAB
ANION GAP SERPL CALC-SCNC: 8 MMOL/L (ref 7–16)
BASOPHILS # BLD AUTO: 0.2 % (ref 0–1.8)
BASOPHILS # BLD: 0.02 K/UL (ref 0–0.12)
BUN SERPL-MCNC: 40 MG/DL (ref 8–22)
CALCIUM SERPL-MCNC: 9.2 MG/DL (ref 8.5–10.5)
CHLORIDE SERPL-SCNC: 99 MMOL/L (ref 96–112)
CO2 SERPL-SCNC: 25 MMOL/L (ref 20–33)
CREAT SERPL-MCNC: 1.07 MG/DL (ref 0.5–1.4)
EOSINOPHIL # BLD AUTO: 0 K/UL (ref 0–0.51)
EOSINOPHIL NFR BLD: 0 % (ref 0–6.9)
ERYTHROCYTE [DISTWIDTH] IN BLOOD BY AUTOMATED COUNT: 49.2 FL (ref 35.9–50)
GLUCOSE SERPL-MCNC: 130 MG/DL (ref 65–99)
HCT VFR BLD AUTO: 34.4 % (ref 37–47)
HGB BLD-MCNC: 10.5 G/DL (ref 12–16)
IMM GRANULOCYTES # BLD AUTO: 0.12 K/UL (ref 0–0.11)
IMM GRANULOCYTES NFR BLD AUTO: 1.1 % (ref 0–0.9)
LYMPHOCYTES # BLD AUTO: 0.67 K/UL (ref 1–4.8)
LYMPHOCYTES NFR BLD: 6 % (ref 22–41)
MCH RBC QN AUTO: 25.8 PG (ref 27–33)
MCHC RBC AUTO-ENTMCNC: 30.5 G/DL (ref 33.6–35)
MCV RBC AUTO: 84.5 FL (ref 81.4–97.8)
MONOCYTES # BLD AUTO: 0.77 K/UL (ref 0–0.85)
MONOCYTES NFR BLD AUTO: 6.9 % (ref 0–13.4)
NEUTROPHILS # BLD AUTO: 9.52 K/UL (ref 2–7.15)
NEUTROPHILS NFR BLD: 85.8 % (ref 44–72)
NRBC # BLD AUTO: 0 K/UL
NRBC BLD-RTO: 0 /100 WBC
PLATELET # BLD AUTO: 272 K/UL (ref 164–446)
PMV BLD AUTO: 10.7 FL (ref 9–12.9)
POTASSIUM SERPL-SCNC: 4.4 MMOL/L (ref 3.6–5.5)
RBC # BLD AUTO: 4.07 M/UL (ref 4.2–5.4)
SODIUM SERPL-SCNC: 132 MMOL/L (ref 135–145)
WBC # BLD AUTO: 11.1 K/UL (ref 4.8–10.8)

## 2020-12-24 PROCEDURE — 700102 HCHG RX REV CODE 250 W/ 637 OVERRIDE(OP): Performed by: STUDENT IN AN ORGANIZED HEALTH CARE EDUCATION/TRAINING PROGRAM

## 2020-12-24 PROCEDURE — 770020 HCHG ROOM/CARE - TELE (206)

## 2020-12-24 PROCEDURE — A9270 NON-COVERED ITEM OR SERVICE: HCPCS | Performed by: STUDENT IN AN ORGANIZED HEALTH CARE EDUCATION/TRAINING PROGRAM

## 2020-12-24 PROCEDURE — A9270 NON-COVERED ITEM OR SERVICE: HCPCS | Performed by: NURSE PRACTITIONER

## 2020-12-24 PROCEDURE — 80048 BASIC METABOLIC PNL TOTAL CA: CPT

## 2020-12-24 PROCEDURE — 99232 SBSQ HOSP IP/OBS MODERATE 35: CPT | Performed by: STUDENT IN AN ORGANIZED HEALTH CARE EDUCATION/TRAINING PROGRAM

## 2020-12-24 PROCEDURE — 85025 COMPLETE CBC W/AUTO DIFF WBC: CPT

## 2020-12-24 PROCEDURE — 700111 HCHG RX REV CODE 636 W/ 250 OVERRIDE (IP): Performed by: ORTHOPAEDIC SURGERY

## 2020-12-24 PROCEDURE — 97162 PT EVAL MOD COMPLEX 30 MIN: CPT

## 2020-12-24 PROCEDURE — 700105 HCHG RX REV CODE 258: Performed by: STUDENT IN AN ORGANIZED HEALTH CARE EDUCATION/TRAINING PROGRAM

## 2020-12-24 PROCEDURE — 700102 HCHG RX REV CODE 250 W/ 637 OVERRIDE(OP): Performed by: NURSE PRACTITIONER

## 2020-12-24 PROCEDURE — 36415 COLL VENOUS BLD VENIPUNCTURE: CPT

## 2020-12-24 RX ORDER — HYDROMORPHONE HYDROCHLORIDE 1 MG/ML
0.5 INJECTION, SOLUTION INTRAMUSCULAR; INTRAVENOUS; SUBCUTANEOUS EVERY 6 HOURS PRN
Status: DISCONTINUED | OUTPATIENT
Start: 2020-12-24 | End: 2020-12-26

## 2020-12-24 RX ADMIN — Medication 1 G: at 20:30

## 2020-12-24 RX ADMIN — AMIODARONE HYDROCHLORIDE 200 MG: 200 TABLET ORAL at 05:06

## 2020-12-24 RX ADMIN — ATORVASTATIN CALCIUM 20 MG: 20 TABLET, FILM COATED ORAL at 17:22

## 2020-12-24 RX ADMIN — CEFAZOLIN SODIUM 2 G: 2 INJECTION, SOLUTION INTRAVENOUS at 05:07

## 2020-12-24 RX ADMIN — SODIUM CHLORIDE 1000 ML: 9 INJECTION, SOLUTION INTRAVENOUS at 05:07

## 2020-12-24 RX ADMIN — RIVAROXABAN 20 MG: 20 TABLET, FILM COATED ORAL at 17:22

## 2020-12-24 RX ADMIN — ENOXAPARIN SODIUM 40 MG: 40 INJECTION SUBCUTANEOUS at 01:01

## 2020-12-24 RX ADMIN — Medication 1 G: at 08:05

## 2020-12-24 RX ADMIN — DEXAMETHASONE 6 MG: 4 TABLET ORAL at 05:07

## 2020-12-24 ASSESSMENT — ENCOUNTER SYMPTOMS
CHILLS: 0
FOCAL WEAKNESS: 0
FALLS: 1
MYALGIAS: 0
WEAKNESS: 1
HEMOPTYSIS: 0
VOMITING: 0
BRUISES/BLEEDS EASILY: 0
BLURRED VISION: 0
NAUSEA: 0
FEVER: 0
PALPITATIONS: 0
DEPRESSION: 0
COUGH: 0

## 2020-12-24 NOTE — THERAPY
"{   12/24/20 1203   Initial Contact Note    Initial Contact Note Order Received and Verified, Physical Therapy Evaluation in Progress with Full Report to Follow.   Interdisciplinary Plan of Care Collaboration   IDT Collaboration with  Nursing;Occupational Therapist   Patient Position at End of Therapy In Bed;Tray Table within Reach;Call Light within Reach;Phone within Reach   Collaboration Comments pt biligerant, yelling at therapists saying she \"can't walk if she can't put wt on her foot\", \"get out of here\". Will reattempt at later time.      Session Information   Date / Session Number  12/24/20 attempted eval    Priority 4     "

## 2020-12-24 NOTE — THERAPY
"Occupational Therapy   Initial Evaluation     Patient Name: Linda Goodwin  Age:  75 y.o., Sex:  female  Medical Record #: [id number]  Today's Date: 12/24/2020          Pt refused eval       12/24/20 0943   Interdisciplinary Plan of Care Collaboration   Collaboration Comments Attempted eval, pt stating \"this is stupid I can't put weight on my foot so I'm not getting up\".  Attempteted to explane that the Doctor would like pt to try. Pt began yell at therapist that the alarm is going off driving her crazy.  Noted that the roommates IV pump was beeping.  Tryied to tell the patiant that the noise was coming from the roommates IV pump, and the nurse was the only one that can fix it.  the pt then yelled at this therapist telling me to leave the room. which this therapist did. Will try to eval pt as able.            "

## 2020-12-24 NOTE — DISCHARGE PLANNING
Anticipated Discharge Disposition: TBD    Action: PT discussed in IDT rounds. She had ankle surgery yesterday. Pending PT/OT evals.     Barriers to Discharge: PT/Ot needs.     Plan: LSW to continue to coordinate with Pt, Pt's family, and medical team for discharge needs.

## 2020-12-24 NOTE — PROGRESS NOTES
[facility] Medicine Daily Progress Note    Date of Service  12/24/2020    Chief Complaint  75 y.o. female admitted 12/21/2020 with   Chief Complaint   Patient presents with   • Ankle Pain     pt felt dizzy after using restroom, stood up, GLF, unable to get up r/t R ankle pain         Hospital Course    75 y.o. female who presented 12/21/2020 with history of morbid obesity, Afib on Xarelto and amiodarone and 3 falls over the last year resulting in fractures.  She presents with pain in her right ankle after ground-level fall at home.  Just prior to arrival she was sitting on the toilet, when she stood up she felt off balance when trying to pull up her pants.  She does not believe she lost consciousness and denies any head trauma.  She remembers her son coming to her and calling EMS.    In the emergency department she is found to have a trimalleolar fracture with subsequent reduction.  Orthopedic surgery is consulted, s/p ORIF R ankle 12/23.     She was found to be hypoxic and was placed on supplemental oxygen.  Her chest x-ray was negative though her COVID PCR was positive. Procalcitonin neg. Decadron started on 12/21.    Interval Problem Update  Patient was seen and examined at the bedside. No overnight events reported.  VS reviewed.   Patient denies fever, chills, chest pain, shortness breath.  She is on 3 L nasal cannula.  S/p ORIF R ankle 12/23, dressing clean, dry, neurovascular intact    Consultants/Specialty  orthopedics    Code Status  DNAR/DNI    Disposition  TBD    Review of Systems  Review of Systems   Constitutional: Positive for malaise/fatigue. Negative for chills and fever.   HENT: Negative for ear discharge.    Eyes: Negative for blurred vision.   Respiratory: Negative for cough and hemoptysis.    Cardiovascular: Negative for chest pain and palpitations.   Gastrointestinal: Negative for nausea and vomiting.   Genitourinary: Negative for dysuria.   Musculoskeletal: Positive for falls. Negative for  myalgias.   Skin: Negative for rash.   Neurological: Positive for weakness. Negative for focal weakness.   Endo/Heme/Allergies: Does not bruise/bleed easily.   Psychiatric/Behavioral: Negative for depression.        Physical Exam  Temp:  [35.9 °C (96.7 °F)-36.7 °C (98 °F)] 36.3 °C (97.3 °F)  Pulse:  [53-67] 61  Resp:  [16-20] 19  BP: (100-135)/(45-68) 135/45  SpO2:  [90 %-98 %] 91 %    Physical Exam  Vitals signs reviewed.   Constitutional:       Appearance: Normal appearance. She is obese.   HENT:      Head: Normocephalic and atraumatic.      Nose: Nose normal.      Mouth/Throat:      Pharynx: Oropharynx is clear.   Eyes:      Extraocular Movements: Extraocular movements intact.      Conjunctiva/sclera: Conjunctivae normal.      Pupils: Pupils are equal, round, and reactive to light.   Neck:      Musculoskeletal: Normal range of motion and neck supple.   Cardiovascular:      Rate and Rhythm: Normal rate and regular rhythm.      Pulses: Normal pulses.      Heart sounds: Normal heart sounds.   Pulmonary:      Effort: Pulmonary effort is normal.      Breath sounds: Normal breath sounds. No wheezing.      Comments: Oxygen supplements  Diminished bilateral breath sound  Abdominal:      General: Abdomen is flat. Bowel sounds are normal.      Palpations: Abdomen is soft.   Musculoskeletal: Normal range of motion.      Comments: Right lower extremity dressing dry, no drainage.    Skin:     General: Skin is warm and dry.   Neurological:      General: No focal deficit present.      Mental Status: She is alert and oriented to person, place, and time. Mental status is at baseline.   Psychiatric:         Mood and Affect: Mood normal.         Behavior: Behavior normal.         Fluids    Intake/Output Summary (Last 24 hours) at 12/24/2020 0942  Last data filed at 12/24/2020 0300  Gross per 24 hour   Intake 2300 ml   Output 1100 ml   Net 1200 ml       Laboratory  Recent Labs     12/22/20  0521 12/23/20  0726 12/24/20  0712   WBC  12.3* 14.0* 11.1*   RBC 4.48 4.51 4.07*   HEMOGLOBIN 11.4* 11.4* 10.5*   HEMATOCRIT 36.5* 37.6 34.4*   MCV 81.5 83.4 84.5   MCH 25.4* 25.3* 25.8*   MCHC 31.2* 30.3* 30.5*   RDW 46.7 47.6 49.2   PLATELETCT 279 301 272   MPV 10.3 10.6 10.7     Recent Labs     12/22/20  0521 12/23/20  0726 12/24/20  0712   SODIUM 129* 129* 132*   POTASSIUM 4.1 4.2 4.4   CHLORIDE 95* 97 99   CO2 23 25 25   GLUCOSE 120* 134* 130*   BUN 38* 39* 40*   CREATININE 1.51* 1.04 1.07   CALCIUM 9.5 9.6 9.2                   Imaging  DX-PORTABLE FLUOROSCOPY < 1 HOUR   Final Result      Intraoperative fluoroscopic spot images as described above.      DX-ANKLE 2- VIEWS RIGHT   Final Result      Intraoperative fluoroscopic spot images as described above.      DX-ANKLE 2- VIEWS RIGHT   Final Result      Improved alignment of ankle subluxation and trimalleolar fracture status post closed reduction and splinting.            DX-CHEST-PORTABLE (1 VIEW)   Final Result      Cardiac silhouette enlargement.            DX-ANKLE 2- VIEWS RIGHT   Final Result      Mildly improved alignment of ankle subluxation and trimalleolar fracture status post closed reduction.         DX-ANKLE 3+ VIEWS RIGHT   Final Result      Trimalleolar fracture with ankle subluxation.              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    Ortho consulted: s/p ORIF R ankle 12/23  Pain managements  Weight Bearing Status-TTWB x 6 weeks  PT/OT    Acute hypoxemic respiratory failure due to COVID-19 ([facility])  Assessment & Plan  Continue oxygen supplements, wean as tolerated  Procalcitonin negative  Start the Decadron 12/21  Supportive managements: RT protocol, judicious fluid, self prone    COVID-19  Assessment & Plan  Procalcitonin normal, No indication for antibiotics  D-dimer:3.66: No leg pain, swelling, no tachycardia. Will hold on doppler u/s and CTA at this point,   Management as above    A-fib  (HCC)  Assessment & Plan  Rate controlled  Cont amiodarone and resume Xarelto 12/24    MAT (acute kidney injury) (HCC)  Assessment & Plan  Resolved after IVF.  Continue monitoring      Hyponatremia  Assessment & Plan  Mild to moderate hyponatremia, asymptomatic  On salt tablets  continue monitoring    Leukocytosis  Assessment & Plan  Procalcitonin normal, likely secondary to steroid  Continue monitoring    Falls frequently  Assessment & Plan  Associated with severe physical deconditioning.  Follow-up PT eval    Morbid obesity (HCC)  Assessment & Plan  diet and lifestyle modification recommended    Chronic anticoagulation  Assessment & Plan  On hold prior surgery.  Discussed with ortho scott Egan to resume it on 12/24       VTE prophylaxis: xarelto

## 2020-12-24 NOTE — PROGRESS NOTES
Patient seen and examined  No pain at all this am  Not happy that she has to be TTWB RLE for 6 weeks    /45   Pulse 61   Temp 36.3 °C (97.3 °F) (Temporal)   Resp 19   Ht 1.524 m (5')   Wt 109.5 kg (241 lb 8.2 oz)   SpO2 91%     Recent Labs     12/22/20  0521 12/23/20  0726 12/24/20  0712   WBC 12.3* 14.0* 11.1*   RBC 4.48 4.51 4.07*   HEMOGLOBIN 11.4* 11.4* 10.5*   HEMATOCRIT 36.5* 37.6 34.4*   MCV 81.5 83.4 84.5   MCH 25.4* 25.3* 25.8*   MCHC 31.2* 30.3* 30.5*   RDW 46.7 47.6 49.2   PLATELETCT 279 301 272   MPV 10.3 10.6 10.7       No acute distress  Dressing clean dry and intact  Neurovascularly intact    POD# 1  S/P ORIF right ankle    Plan:  DVT Prophylaxis- TEDS/SCDs, lovenox while in hospital, ASA 81 mg PO BID as outpatient  Weight Bearing Status-TTWB x 6 weeks  PT/OT  Antibiotics: None  Case Coordination

## 2020-12-25 LAB
ANION GAP SERPL CALC-SCNC: 7 MMOL/L (ref 7–16)
ANISOCYTOSIS BLD QL SMEAR: ABNORMAL
BASOPHILS # BLD AUTO: 0.2 % (ref 0–1.8)
BASOPHILS # BLD: 0.02 K/UL (ref 0–0.12)
BUN SERPL-MCNC: 39 MG/DL (ref 8–22)
CALCIUM SERPL-MCNC: 9.9 MG/DL (ref 8.5–10.5)
CHLORIDE SERPL-SCNC: 103 MMOL/L (ref 96–112)
CO2 SERPL-SCNC: 25 MMOL/L (ref 20–33)
COMMENT 1642: NORMAL
CREAT SERPL-MCNC: 0.84 MG/DL (ref 0.5–1.4)
EOSINOPHIL # BLD AUTO: 0 K/UL (ref 0–0.51)
EOSINOPHIL NFR BLD: 0 % (ref 0–6.9)
ERYTHROCYTE [DISTWIDTH] IN BLOOD BY AUTOMATED COUNT: 50.2 FL (ref 35.9–50)
GLUCOSE SERPL-MCNC: 110 MG/DL (ref 65–99)
HCT VFR BLD AUTO: 35 % (ref 37–47)
HGB BLD-MCNC: 10.4 G/DL (ref 12–16)
HYPOCHROMIA BLD QL SMEAR: ABNORMAL
IMM GRANULOCYTES # BLD AUTO: 0.16 K/UL (ref 0–0.11)
IMM GRANULOCYTES NFR BLD AUTO: 1.4 % (ref 0–0.9)
LYMPHOCYTES # BLD AUTO: 0.64 K/UL (ref 1–4.8)
LYMPHOCYTES NFR BLD: 5.7 % (ref 22–41)
MCH RBC QN AUTO: 25.4 PG (ref 27–33)
MCHC RBC AUTO-ENTMCNC: 29.7 G/DL (ref 33.6–35)
MCV RBC AUTO: 85.4 FL (ref 81.4–97.8)
MICROCYTES BLD QL SMEAR: ABNORMAL
MONOCYTES # BLD AUTO: 1.03 K/UL (ref 0–0.85)
MONOCYTES NFR BLD AUTO: 9.1 % (ref 0–13.4)
MORPHOLOGY BLD-IMP: NORMAL
NEUTROPHILS # BLD AUTO: 9.42 K/UL (ref 2–7.15)
NEUTROPHILS NFR BLD: 83.6 % (ref 44–72)
NRBC # BLD AUTO: 0 K/UL
NRBC BLD-RTO: 0 /100 WBC
PLATELET # BLD AUTO: 315 K/UL (ref 164–446)
PLATELET BLD QL SMEAR: NORMAL
PMV BLD AUTO: 10.3 FL (ref 9–12.9)
POTASSIUM SERPL-SCNC: 4.6 MMOL/L (ref 3.6–5.5)
RBC # BLD AUTO: 4.1 M/UL (ref 4.2–5.4)
RBC BLD AUTO: PRESENT
SODIUM SERPL-SCNC: 135 MMOL/L (ref 135–145)
WBC # BLD AUTO: 11.3 K/UL (ref 4.8–10.8)

## 2020-12-25 PROCEDURE — 700102 HCHG RX REV CODE 250 W/ 637 OVERRIDE(OP): Performed by: STUDENT IN AN ORGANIZED HEALTH CARE EDUCATION/TRAINING PROGRAM

## 2020-12-25 PROCEDURE — A9270 NON-COVERED ITEM OR SERVICE: HCPCS | Performed by: STUDENT IN AN ORGANIZED HEALTH CARE EDUCATION/TRAINING PROGRAM

## 2020-12-25 PROCEDURE — 80048 BASIC METABOLIC PNL TOTAL CA: CPT

## 2020-12-25 PROCEDURE — 85025 COMPLETE CBC W/AUTO DIFF WBC: CPT

## 2020-12-25 PROCEDURE — 770020 HCHG ROOM/CARE - TELE (206)

## 2020-12-25 PROCEDURE — A9270 NON-COVERED ITEM OR SERVICE: HCPCS | Performed by: ORTHOPAEDIC SURGERY

## 2020-12-25 PROCEDURE — 99232 SBSQ HOSP IP/OBS MODERATE 35: CPT | Performed by: STUDENT IN AN ORGANIZED HEALTH CARE EDUCATION/TRAINING PROGRAM

## 2020-12-25 PROCEDURE — 36415 COLL VENOUS BLD VENIPUNCTURE: CPT

## 2020-12-25 PROCEDURE — 700102 HCHG RX REV CODE 250 W/ 637 OVERRIDE(OP): Performed by: NURSE PRACTITIONER

## 2020-12-25 PROCEDURE — 700111 HCHG RX REV CODE 636 W/ 250 OVERRIDE (IP): Performed by: ORTHOPAEDIC SURGERY

## 2020-12-25 PROCEDURE — A9270 NON-COVERED ITEM OR SERVICE: HCPCS | Performed by: NURSE PRACTITIONER

## 2020-12-25 PROCEDURE — 700111 HCHG RX REV CODE 636 W/ 250 OVERRIDE (IP): Performed by: STUDENT IN AN ORGANIZED HEALTH CARE EDUCATION/TRAINING PROGRAM

## 2020-12-25 PROCEDURE — 700102 HCHG RX REV CODE 250 W/ 637 OVERRIDE(OP): Performed by: ORTHOPAEDIC SURGERY

## 2020-12-25 RX ADMIN — ACETAMINOPHEN 650 MG: 325 TABLET, FILM COATED ORAL at 12:07

## 2020-12-25 RX ADMIN — ACETAMINOPHEN 650 MG: 325 TABLET, FILM COATED ORAL at 17:15

## 2020-12-25 RX ADMIN — HYDROMORPHONE HYDROCHLORIDE 0.5 MG: 1 INJECTION, SOLUTION INTRAMUSCULAR; INTRAVENOUS; SUBCUTANEOUS at 23:40

## 2020-12-25 RX ADMIN — DIPHENHYDRAMINE HYDROCHLORIDE: 50 INJECTION INTRAMUSCULAR; INTRAVENOUS at 20:49

## 2020-12-25 RX ADMIN — Medication 1 G: at 09:03

## 2020-12-25 RX ADMIN — DOCUSATE SODIUM 100 MG: 100 CAPSULE ORAL at 17:15

## 2020-12-25 RX ADMIN — OXYCODONE 5 MG: 5 TABLET ORAL at 12:07

## 2020-12-25 RX ADMIN — Medication 1 G: at 20:50

## 2020-12-25 RX ADMIN — ATORVASTATIN CALCIUM 20 MG: 20 TABLET, FILM COATED ORAL at 17:16

## 2020-12-25 RX ADMIN — RIVAROXABAN 20 MG: 20 TABLET, FILM COATED ORAL at 17:16

## 2020-12-25 RX ADMIN — AMIODARONE HYDROCHLORIDE 200 MG: 200 TABLET ORAL at 05:21

## 2020-12-25 RX ADMIN — DEXAMETHASONE 6 MG: 4 TABLET ORAL at 05:21

## 2020-12-25 ASSESSMENT — CHA2DS2 SCORE
SEX: FEMALE
VASCULAR DISEASE: NO
DIABETES: NO
CHF OR LEFT VENTRICULAR DYSFUNCTION: NO
AGE 65 TO 74: NO
AGE 75 OR GREATER: YES
CHA2DS2 VASC SCORE: 3
PRIOR STROKE OR TIA OR THROMBOEMBOLISM: NO
HYPERTENSION: NO

## 2020-12-25 ASSESSMENT — ENCOUNTER SYMPTOMS
BLURRED VISION: 0
FEVER: 0
HEMOPTYSIS: 0
COUGH: 0
BRUISES/BLEEDS EASILY: 0
VOMITING: 0
CHILLS: 0
PALPITATIONS: 0
FOCAL WEAKNESS: 0
WEAKNESS: 1
DEPRESSION: 0
NAUSEA: 0
MYALGIAS: 0
FALLS: 1

## 2020-12-25 ASSESSMENT — PAIN DESCRIPTION - PAIN TYPE
TYPE: ACUTE PAIN
TYPE: ACUTE PAIN

## 2020-12-25 NOTE — PROGRESS NOTES
[facility] Medicine Daily Progress Note    Date of Service  12/25/2020    Chief Complaint  75 y.o. female admitted 12/21/2020 with   Chief Complaint   Patient presents with   • Ankle Pain     pt felt dizzy after using restroom, stood up, GLF, unable to get up r/t R ankle pain         Hospital Course    75 y.o. female who presented 12/21/2020 with history of morbid obesity, Afib on Xarelto and amiodarone and 3 falls over the last year resulting in fractures.  She presents with pain in her right ankle after ground-level fall at home.  Just prior to arrival she was sitting on the toilet, when she stood up she felt off balance when trying to pull up her pants.  She does not believe she lost consciousness and denies any head trauma.  She remembers her son coming to her and calling EMS.    In the emergency department she is found to have a trimalleolar fracture with subsequent reduction.  Orthopedic surgery is consulted, s/p ORIF R ankle 12/23. PT/OT    She was found to be hypoxic and was placed on supplemental oxygen.  Her chest x-ray was negative though her COVID PCR was positive. Procalcitonin neg. Decadron started on 12/21-12/30.    Interval Problem Update  Patient was seen and examined at the bedside. No overnight events reported.  VS reviewed.   Patient denies fever, chills, chest pain, shortness breath.  She is on 3 L nasal cannula.  S/p ORIF R ankle 12/23, dressing clean, dry, neurovascular intact    Consultants/Specialty  orthopedics    Code Status  DNAR/DNI    Disposition  TBD    Review of Systems  Review of Systems   Constitutional: Positive for malaise/fatigue. Negative for chills and fever.   HENT: Negative for ear discharge.    Eyes: Negative for blurred vision.   Respiratory: Negative for cough and hemoptysis.    Cardiovascular: Negative for chest pain and palpitations.   Gastrointestinal: Negative for nausea and vomiting.   Genitourinary: Negative for dysuria.   Musculoskeletal: Positive for falls. Negative  for myalgias.   Skin: Negative for rash.   Neurological: Positive for weakness. Negative for focal weakness.   Endo/Heme/Allergies: Does not bruise/bleed easily.   Psychiatric/Behavioral: Negative for depression.        Physical Exam  Temp:  [36.1 °C (97 °F)-36.8 °C (98.3 °F)] 36.8 °C (98.3 °F)  Pulse:  [67-72] 72  Resp:  [19-20] 19  BP: (120-152)/(65-82) 120/82  SpO2:  [92 %-94 %] 94 %    Physical Exam  Vitals signs reviewed.   Constitutional:       Appearance: Normal appearance. She is obese.   HENT:      Head: Normocephalic and atraumatic.      Nose: Nose normal.      Mouth/Throat:      Pharynx: Oropharynx is clear.   Eyes:      Extraocular Movements: Extraocular movements intact.      Conjunctiva/sclera: Conjunctivae normal.      Pupils: Pupils are equal, round, and reactive to light.   Neck:      Musculoskeletal: Normal range of motion and neck supple.   Cardiovascular:      Rate and Rhythm: Normal rate and regular rhythm.      Pulses: Normal pulses.      Heart sounds: Normal heart sounds.   Pulmonary:      Effort: Pulmonary effort is normal.      Breath sounds: Normal breath sounds. No wheezing.      Comments: Oxygen supplements  Diminished bilateral breath sound  Abdominal:      General: Abdomen is flat. Bowel sounds are normal.      Palpations: Abdomen is soft.   Musculoskeletal: Normal range of motion.      Comments: Right lower extremity dressing dry, no drainage.    Skin:     General: Skin is warm and dry.   Neurological:      General: No focal deficit present.      Mental Status: She is alert and oriented to person, place, and time. Mental status is at baseline.   Psychiatric:         Mood and Affect: Mood normal.         Behavior: Behavior normal.         Fluids    Intake/Output Summary (Last 24 hours) at 12/25/2020 1238  Last data filed at 12/24/2020 2100  Gross per 24 hour   Intake 240 ml   Output 600 ml   Net -360 ml       Laboratory  Recent Labs     12/23/20  0726 12/24/20  0712 12/25/20  0740   WBC  14.0* 11.1* 11.3*   RBC 4.51 4.07* 4.10*   HEMOGLOBIN 11.4* 10.5* 10.4*   HEMATOCRIT 37.6 34.4* 35.0*   MCV 83.4 84.5 85.4   MCH 25.3* 25.8* 25.4*   MCHC 30.3* 30.5* 29.7*   RDW 47.6 49.2 50.2*   PLATELETCT 301 272 315   MPV 10.6 10.7 10.3     Recent Labs     12/23/20  0726 12/24/20  0712 12/25/20  0740   SODIUM 129* 132* 135   POTASSIUM 4.2 4.4 4.6   CHLORIDE 97 99 103   CO2 25 25 25   GLUCOSE 134* 130* 110*   BUN 39* 40* 39*   CREATININE 1.04 1.07 0.84   CALCIUM 9.6 9.2 9.9                   Imaging  DX-PORTABLE FLUOROSCOPY < 1 HOUR   Final Result      Intraoperative fluoroscopic spot images as described above.      DX-ANKLE 2- VIEWS RIGHT   Final Result      Intraoperative fluoroscopic spot images as described above.      DX-ANKLE 2- VIEWS RIGHT   Final Result      Improved alignment of ankle subluxation and trimalleolar fracture status post closed reduction and splinting.            DX-CHEST-PORTABLE (1 VIEW)   Final Result      Cardiac silhouette enlargement.            DX-ANKLE 2- VIEWS RIGHT   Final Result      Mildly improved alignment of ankle subluxation and trimalleolar fracture status post closed reduction.         DX-ANKLE 3+ VIEWS RIGHT   Final Result      Trimalleolar fracture with ankle subluxation.              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    Ortho consulted: s/p ORIF R ankle 12/23  Pain managements  Weight Bearing Status-TTWB x 6 weeks  PT/OT    Acute hypoxemic respiratory failure due to COVID-19 ([facility])  Assessment & Plan  Continue oxygen supplements, wean as tolerated  Procalcitonin negative  Start the Decadron 12/21-12/30  Supportive managements: RT protocol, judicious fluid, self prone    COVID-19  Assessment & Plan  Procalcitonin normal, No indication for antibiotics  D-dimer:3.66: No leg pain, swelling, no tachycardia. Will hold on doppler u/s and CTA at this point,   Management as  above    A-fib (HCC)  Assessment & Plan  Rate controlled  Cont amiodarone and resume Xarelto 12/24    MAT (acute kidney injury) (HCC)  Assessment & Plan  Resolved after IVF.  Continue monitoring      Hyponatremia  Assessment & Plan  Resolved, asymptomatic  On salt tablets  continue monitoring    Leukocytosis  Assessment & Plan  Procalcitonin normal, likely secondary to steroid  Continue monitoring    Falls frequently  Assessment & Plan  Associated with severe physical deconditioning.  Follow-up PT eval    Morbid obesity (HCC)  Assessment & Plan  diet and lifestyle modification recommended    Chronic anticoagulation  Assessment & Plan  On hold prior surgery.  Discussed with ortho scott Egan to resume it on 12/24       VTE prophylaxis: xarelto

## 2020-12-25 NOTE — PROGRESS NOTES
"At bedside with CNA , pt refused any am care states \" I want to be just left alone\"  " "Hospital/TCU/ED for chronic condition Discharge Protocol    \"Hi, my name is Song Hood RN, a registered nurse, and I am calling from [facility].  I am calling to follow up and see how things are going for you after your recent emergency visit/hospital/TCU stay.\"    Tell me how you are doing now that you are home?\" \"well, fighting cancer\" doing ok for right now.  Has follow up appointments at the       Discharge Instructions    \"Let's review your discharge instructions.  What is/are the follow-up recommendations?  She had ongoing appointments at the     \"Has an appointment with your primary care provider been scheduled?\"   does not see [facility] for primary, sees Dr. Barcenas    \"When you see the provider, I would recommend that you bring your medications with you.\"    Medications    \"Tell me what changed about your medicines when you discharged?\"    Changes to chronic meds?    0-1    \"What questions do you have about your medications?\"    None     New diagnoses of heart failure, COPD, diabetes, or MI?    No              Post Discharge Medication Reconciliation Status: discharge medications reconciled, continue medications without change.    Was MTM referral placed (*Make sure to put transitions as reason for referral)?   No    Call Summary    \"What questions or concerns do you have about your recent visit and your follow-up care?\"     none    \"If you have questions or things don't continue to improve, we encourage you contact us through the main clinic number (give number).  Even if the clinic is not open, triage nurses are available 24/7 to help you.     We would like you to know that our clinic has extended hours (provide information).  We also have urgent care (provide details on closest location and hours/contact info)\"      \"Thank you for your time and take care!\"             "

## 2020-12-25 NOTE — PROGRESS NOTES
Assumed care of pt Pt A/O x 4 Able to verbalize own needs Respirations even and unlabored on 3lpm/nc Denies needs or c/os at this time Informed to call with needs SR up x 2 Call bell in reach.

## 2020-12-26 PROCEDURE — A9270 NON-COVERED ITEM OR SERVICE: HCPCS | Performed by: STUDENT IN AN ORGANIZED HEALTH CARE EDUCATION/TRAINING PROGRAM

## 2020-12-26 PROCEDURE — 770020 HCHG ROOM/CARE - TELE (206)

## 2020-12-26 PROCEDURE — A9270 NON-COVERED ITEM OR SERVICE: HCPCS | Performed by: ORTHOPAEDIC SURGERY

## 2020-12-26 PROCEDURE — 700102 HCHG RX REV CODE 250 W/ 637 OVERRIDE(OP): Performed by: STUDENT IN AN ORGANIZED HEALTH CARE EDUCATION/TRAINING PROGRAM

## 2020-12-26 PROCEDURE — 97162 PT EVAL MOD COMPLEX 30 MIN: CPT

## 2020-12-26 PROCEDURE — 700102 HCHG RX REV CODE 250 W/ 637 OVERRIDE(OP): Performed by: ORTHOPAEDIC SURGERY

## 2020-12-26 PROCEDURE — 700102 HCHG RX REV CODE 250 W/ 637 OVERRIDE(OP): Performed by: NURSE PRACTITIONER

## 2020-12-26 PROCEDURE — 99232 SBSQ HOSP IP/OBS MODERATE 35: CPT | Performed by: STUDENT IN AN ORGANIZED HEALTH CARE EDUCATION/TRAINING PROGRAM

## 2020-12-26 PROCEDURE — A9270 NON-COVERED ITEM OR SERVICE: HCPCS | Performed by: NURSE PRACTITIONER

## 2020-12-26 RX ORDER — OXYCODONE HYDROCHLORIDE 5 MG/1
5 TABLET ORAL EVERY 4 HOURS PRN
Status: DISCONTINUED | OUTPATIENT
Start: 2020-12-26 | End: 2020-12-31 | Stop reason: HOSPADM

## 2020-12-26 RX ORDER — OXYCODONE HYDROCHLORIDE 5 MG/1
2.5 TABLET ORAL
Status: CANCELLED | OUTPATIENT
Start: 2020-12-26

## 2020-12-26 RX ADMIN — DEXAMETHASONE 6 MG: 4 TABLET ORAL at 06:37

## 2020-12-26 RX ADMIN — ATORVASTATIN CALCIUM 20 MG: 20 TABLET, FILM COATED ORAL at 18:00

## 2020-12-26 RX ADMIN — AMIODARONE HYDROCHLORIDE 200 MG: 200 TABLET ORAL at 06:00

## 2020-12-26 RX ADMIN — Medication 1 G: at 22:15

## 2020-12-26 RX ADMIN — Medication 1 G: at 09:18

## 2020-12-26 RX ADMIN — ACETAMINOPHEN 650 MG: 325 TABLET, FILM COATED ORAL at 18:01

## 2020-12-26 RX ADMIN — RIVAROXABAN 20 MG: 20 TABLET, FILM COATED ORAL at 18:00

## 2020-12-26 ASSESSMENT — ENCOUNTER SYMPTOMS
NAUSEA: 0
HEMOPTYSIS: 0
FALLS: 1
COUGH: 0
MYALGIAS: 0
WEAKNESS: 1
DEPRESSION: 0
FEVER: 0
BRUISES/BLEEDS EASILY: 0
FOCAL WEAKNESS: 0
VOMITING: 0
PALPITATIONS: 0
CHILLS: 0
BLURRED VISION: 0

## 2020-12-26 ASSESSMENT — COGNITIVE AND FUNCTIONAL STATUS - GENERAL
SUGGESTED CMS G CODE MODIFIER DAILY ACTIVITY: CK
STANDING UP FROM CHAIR USING ARMS: A LOT
MOVING FROM LYING ON BACK TO SITTING ON SIDE OF FLAT BED: A LOT
TURNING FROM BACK TO SIDE WHILE IN FLAT BAD: UNABLE
MOVING FROM LYING ON BACK TO SITTING ON SIDE OF FLAT BED: UNABLE
DRESSING REGULAR LOWER BODY CLOTHING: A LOT
DRESSING REGULAR UPPER BODY CLOTHING: A LOT
TOILETING: A LOT
MOBILITY SCORE: 7
DAILY ACTIVITIY SCORE: 14
HELP NEEDED FOR BATHING: A LOT
STANDING UP FROM CHAIR USING ARMS: A LOT
MOVING TO AND FROM BED TO CHAIR: A LOT
MOBILITY SCORE: 13
SUGGESTED CMS G CODE MODIFIER MOBILITY: CM
PERSONAL GROOMING: A LOT
WALKING IN HOSPITAL ROOM: TOTAL
TURNING FROM BACK TO SIDE WHILE IN FLAT BAD: A LITTLE
CLIMB 3 TO 5 STEPS WITH RAILING: A LOT
SUGGESTED CMS G CODE MODIFIER MOBILITY: CL
CLIMB 3 TO 5 STEPS WITH RAILING: TOTAL
MOVING TO AND FROM BED TO CHAIR: UNABLE
WALKING IN HOSPITAL ROOM: A LOT

## 2020-12-26 ASSESSMENT — PAIN DESCRIPTION - PAIN TYPE: TYPE: ACUTE PAIN

## 2020-12-26 ASSESSMENT — GAIT ASSESSMENTS: GAIT LEVEL OF ASSIST: UNABLE TO PARTICIPATE

## 2020-12-26 NOTE — PROGRESS NOTES
[facility] Medicine Daily Progress Note    Date of Service  12/26/2020    Chief Complaint  75 y.o. female admitted 12/21/2020 with   Chief Complaint   Patient presents with   • Ankle Pain     pt felt dizzy after using restroom, stood up, GLF, unable to get up r/t R ankle pain         Hospital Course    75 y.o. female who presented 12/21/2020 with history of morbid obesity, Afib on Xarelto and amiodarone and 3 falls over the last year resulting in fractures.  She presents with pain in her right ankle after ground-level fall at home.  Just prior to arrival she was sitting on the toilet, when she stood up she felt off balance when trying to pull up her pants.  She does not believe she lost consciousness and denies any head trauma.  She remembers her son coming to her and calling EMS.    In the emergency department she is found to have a trimalleolar fracture with subsequent reduction.  Orthopedic surgery is consulted, s/p ORIF R ankle 12/23. PT/OT pending    She was found to be hypoxic and was placed on supplemental oxygen.  Her chest x-ray was negative though her COVID PCR was positive. Procalcitonin neg. Decadron started on 12/21-12/30.    Interval Problem Update  Patient was seen and examined at the bedside.  VS reviewed.   Noses bleeding noted. Will change to humidified oxygen.  Patient is upset about not getting enough sleep overnight.  Otherwise she denies fever, chills, chest pain, shortness breath.   PT/OT pending    Consultants/Specialty  orthopedics    Code Status  DNAR/DNI    Disposition  TBD    Review of Systems  Review of Systems   Constitutional: Positive for malaise/fatigue. Negative for chills and fever.   HENT: Negative for ear discharge.    Eyes: Negative for blurred vision.   Respiratory: Negative for cough and hemoptysis.    Cardiovascular: Negative for chest pain and palpitations.   Gastrointestinal: Negative for nausea and vomiting.   Genitourinary: Negative for dysuria.   Musculoskeletal: Positive  for falls. Negative for myalgias.   Skin: Negative for rash.   Neurological: Positive for weakness. Negative for focal weakness.   Endo/Heme/Allergies: Does not bruise/bleed easily.   Psychiatric/Behavioral: Negative for depression.        Physical Exam  Temp:  [36.1 °C (97 °F)-36.9 °C (98.5 °F)] 36.9 °C (98.5 °F)  Pulse:  [63-73] 68  Resp:  [18-20] 18  BP: (117-156)/(50-76) 156/75  SpO2:  [91 %-93 %] 92 %    Physical Exam  Vitals signs reviewed.   Constitutional:       Appearance: Normal appearance. She is obese.   HENT:      Head: Normocephalic and atraumatic.      Nose: Nose normal.      Mouth/Throat:      Pharynx: Oropharynx is clear.   Eyes:      Extraocular Movements: Extraocular movements intact.      Conjunctiva/sclera: Conjunctivae normal.      Pupils: Pupils are equal, round, and reactive to light.   Neck:      Musculoskeletal: Normal range of motion and neck supple.   Cardiovascular:      Rate and Rhythm: Normal rate and regular rhythm.      Pulses: Normal pulses.      Heart sounds: Normal heart sounds.   Pulmonary:      Effort: Pulmonary effort is normal.      Breath sounds: Normal breath sounds. No wheezing.      Comments: Oxygen supplements  Diminished bilateral breath sound  Abdominal:      General: Abdomen is flat. Bowel sounds are normal.      Palpations: Abdomen is soft.   Musculoskeletal: Normal range of motion.      Comments: Right lower extremity dressing dry, no drainage.    Skin:     General: Skin is warm and dry.   Neurological:      General: No focal deficit present.      Mental Status: She is alert and oriented to person, place, and time. Mental status is at baseline.   Psychiatric:         Mood and Affect: Mood normal.         Behavior: Behavior normal.         Fluids    Intake/Output Summary (Last 24 hours) at 12/26/2020 0948  Last data filed at 12/25/2020 2000  Gross per 24 hour   Intake 240 ml   Output --   Net 240 ml       Laboratory  Recent Labs     12/24/20  0712 12/25/20  0740   WBC  11.1* 11.3*   RBC 4.07* 4.10*   HEMOGLOBIN 10.5* 10.4*   HEMATOCRIT 34.4* 35.0*   MCV 84.5 85.4   MCH 25.8* 25.4*   MCHC 30.5* 29.7*   RDW 49.2 50.2*   PLATELETCT 272 315   MPV 10.7 10.3     Recent Labs     12/24/20  0712 12/25/20  0740   SODIUM 132* 135   POTASSIUM 4.4 4.6   CHLORIDE 99 103   CO2 25 25   GLUCOSE 130* 110*   BUN 40* 39*   CREATININE 1.07 0.84   CALCIUM 9.2 9.9                   Imaging  DX-PORTABLE FLUOROSCOPY < 1 HOUR   Final Result      Intraoperative fluoroscopic spot images as described above.      DX-ANKLE 2- VIEWS RIGHT   Final Result      Intraoperative fluoroscopic spot images as described above.      DX-ANKLE 2- VIEWS RIGHT   Final Result      Improved alignment of ankle subluxation and trimalleolar fracture status post closed reduction and splinting.            DX-CHEST-PORTABLE (1 VIEW)   Final Result      Cardiac silhouette enlargement.            DX-ANKLE 2- VIEWS RIGHT   Final Result      Mildly improved alignment of ankle subluxation and trimalleolar fracture status post closed reduction.         DX-ANKLE 3+ VIEWS RIGHT   Final Result      Trimalleolar fracture with ankle subluxation.              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    Ortho consulted: s/p ORIF R ankle 12/23  Pain managements  Weight Bearing Status-TTWB x 6 weeks  PT/OT    Acute hypoxemic respiratory failure due to COVID-19 (HCC)  Assessment & Plan  Continue oxygen supplements, wean as tolerated  Procalcitonin negative  Start the Decadron 12/21-12/30  Supportive managements: RT protocol, judicious fluid, self prone    COVID-19  Assessment & Plan  Procalcitonin normal, No indication for antibiotics  D-dimer:3.66: No leg pain, swelling, no tachycardia. Will hold on doppler u/s and CTA at this point,   Management as above    A-fib (HCC)  Assessment & Plan  Rate controlled  Cont amiodarone and resume Xarelto 12/24    MAT  (acute kidney injury) (HCC)  Assessment & Plan  Resolved after IVF.  Continue monitoring      Hyponatremia  Assessment & Plan  Resolved, asymptomatic  On salt tablets  continue monitoring    Leukocytosis  Assessment & Plan  Procalcitonin normal, likely secondary to steroid  Continue monitoring    Falls frequently  Assessment & Plan  Associated with severe physical deconditioning.  Follow-up PT eval    Morbid obesity (HCC)  Assessment & Plan  BMI 47  diet and lifestyle modification recommended    Chronic anticoagulation  Assessment & Plan  On hold prior surgery.  Discussed with ortho scott Egan to resume it on 12/24       VTE prophylaxis: xarelto

## 2020-12-26 NOTE — PROGRESS NOTES
"Pt with multiple c/os during shift related to confused roommate states she is unable to rest and \" I just want her to shut up for 1 hour\" spoke with charge nurse requested new room but no room available   "

## 2020-12-26 NOTE — THERAPY
"Physical Therapy   Initial Evaluation     Patient Name: Linda Goodwin  Age:  75 y.o., Sex:  female  Medical Record #: [id number]  Today's Date: 12/26/2020     Precautions: Fall Risk, Toe Touch Weight Bearing Right Lower Extremity    Assessment  Patient is a 75 y.o. female admitted following GLF who sustained R bimalleolar ankle fx, now s/p ORIF on 12/23. RLE is TTWB. Pt seen for PT evaluation at this time. Educated and demonstrated TTWB. Pt verbalizes understanding but was unable to maintain during session. Pt irritable during session and made remarks such as \"I'm not doing this right now. I don't feel comfortable with this. I need to be able to do this by myself at home. I can't have someone standing over me. I wont go to a rehab facility.\" Pt was unable to achieve full STS with FWW and unable to maintain RLE TTWB. Discussed DME for home including WC and BSC but pt will be able to need to perform STS or SPT while maintain RLE TTWB to safely mobilize at home in WC level. May need to introduce slide board pending progress. Pt reports lives with son but that he works and will not be able to assist. Educated on role of postacute rehab and pt is refusing. Pt does not appear functionally safe to return home at this time and becomes agitated when therapist states concern for falls or poor healing d/t noncompliance. For most optimal recovery and safest DC, recommend postacute placement. Will follow, pt willing, in acute setting.     Plan  Recommend Physical Therapy 4 times per week until therapy goals are met for the following treatments:  Bed Mobility, Gait Training, Neuro Re-Education / Balance, Self Care/Home Evaluation, Therapeutic Activities and Therapeutic Exercises  DC Equipment Recommendations: Front-Wheel Walker, Wheelchair, Bed Side Commode  Discharge Recommendations: Recommend post-acute placement for additional physical therapy services prior to discharge home (pt refusing but does not have help at " "home)       12/26/20 3670   Prior Living Situation   Prior Services None   Housing / Facility 1 Story House   Steps Into Home 0   Steps In Home 0   Bathroom Set up Bathtub / Shower Combination (pt \"spit bathes\")   Equipment Owned 4-Wheel Walker with a sling seat. (lift recliner.)   Lives with -  Adult Children   Comments pt lives with her son but reports he works and is unable to care for her.    Prior Level of Functional Mobility   Bed Mobility Independent   Transfer Status Independent   Ambulation Independent   Distance Ambulation (Feet) household   Assistive Devices Used None   Cognition    Level of Consciousness Alert   Safety Awareness Impaired   New Learning Impaired   Comments pt irritable and frustrated during session. pt reports \"I need to learn how to do this on my own. why doesnt anyone understand my son works and cant take care of me? i need to do this by myself at home without someone standing over me.\" pt not receptive to education on role of therapy, DME, postacute placement   Balance Assessment   Sitting Balance (Static) Good   Sitting Balance (Dynamic) Fair +   Standing Balance (Static) Poor + (partial stands)   Standing Balance (Dynamic) Poor   Weight Shift Sitting Fair   Weight Shift Standing Poor   Comments unable to fully stand with FWW and unable to shift weight off RLE   Gait Analysis   Gait Level Of Assist Unable to Participate   Weight Bearing Status RLE TTWB   Comments attempted multiple STS, pt unable to maintain RLE TTWB and unable to acheive full stands. pt reports \"no, i dont feel comfortable with this and im not doing this right now. i need to figure it out by myself at home.\"   Bed Mobility    Supine to Sit Moderate Assist (pt sleeps in a lift recliner)   Sit to Supine Maximal Assist   Rolling Supervised (w/ rails)   Functional Mobility   Sit to Stand Unable to Participate   Bed, Chair, WC Transfer Unable to Participate   Comments attempted STS multiple times, unable to acheive full " upright, unable to maintain RLE TTWB   Short Term Goals    Short Term Goal # 1 pt will perform STS with SPV and FWW in 6 visits for improved OOB mobility   Short Term Goal # 2 pt will perform SPT with or without FWW with SPV in 6 visits for WC level mobility at home (may need slide board)

## 2020-12-26 NOTE — PROGRESS NOTES
Received bedside report from RN, pt care assumed, VSS on 3L NC, pt assessment complete. Pt AAOx4. No signs of acute distress noted at this time. POC discussed with pt and verbalizes no questions. Pt denies any additional needs at this time. Bed in lowest position, pt educated on fall risk and verbalized understanding, call light within reach, hourly rounding initiated.

## 2020-12-27 PROCEDURE — 97535 SELF CARE MNGMENT TRAINING: CPT

## 2020-12-27 PROCEDURE — 700111 HCHG RX REV CODE 636 W/ 250 OVERRIDE (IP): Performed by: STUDENT IN AN ORGANIZED HEALTH CARE EDUCATION/TRAINING PROGRAM

## 2020-12-27 PROCEDURE — 770020 HCHG ROOM/CARE - TELE (206)

## 2020-12-27 PROCEDURE — 700102 HCHG RX REV CODE 250 W/ 637 OVERRIDE(OP): Performed by: STUDENT IN AN ORGANIZED HEALTH CARE EDUCATION/TRAINING PROGRAM

## 2020-12-27 PROCEDURE — A9270 NON-COVERED ITEM OR SERVICE: HCPCS | Performed by: STUDENT IN AN ORGANIZED HEALTH CARE EDUCATION/TRAINING PROGRAM

## 2020-12-27 PROCEDURE — 700102 HCHG RX REV CODE 250 W/ 637 OVERRIDE(OP): Performed by: NURSE PRACTITIONER

## 2020-12-27 PROCEDURE — A9270 NON-COVERED ITEM OR SERVICE: HCPCS | Performed by: ORTHOPAEDIC SURGERY

## 2020-12-27 PROCEDURE — 99232 SBSQ HOSP IP/OBS MODERATE 35: CPT | Performed by: STUDENT IN AN ORGANIZED HEALTH CARE EDUCATION/TRAINING PROGRAM

## 2020-12-27 PROCEDURE — 700102 HCHG RX REV CODE 250 W/ 637 OVERRIDE(OP): Performed by: ORTHOPAEDIC SURGERY

## 2020-12-27 PROCEDURE — A9270 NON-COVERED ITEM OR SERVICE: HCPCS | Performed by: NURSE PRACTITIONER

## 2020-12-27 RX ORDER — FUROSEMIDE 10 MG/ML
20 INJECTION INTRAMUSCULAR; INTRAVENOUS ONCE
Status: COMPLETED | OUTPATIENT
Start: 2020-12-27 | End: 2020-12-27

## 2020-12-27 RX ORDER — FLUTICASONE PROPIONATE 50 MCG
2 SPRAY, SUSPENSION (ML) NASAL DAILY
Status: DISCONTINUED | OUTPATIENT
Start: 2020-12-27 | End: 2020-12-31 | Stop reason: HOSPADM

## 2020-12-27 RX ADMIN — AMIODARONE HYDROCHLORIDE 200 MG: 200 TABLET ORAL at 05:45

## 2020-12-27 RX ADMIN — FLUTICASONE PROPIONATE 100 MCG: 50 SPRAY, METERED NASAL at 12:23

## 2020-12-27 RX ADMIN — FUROSEMIDE 20 MG: 10 INJECTION, SOLUTION INTRAMUSCULAR; INTRAVENOUS at 14:18

## 2020-12-27 RX ADMIN — ACETAMINOPHEN 650 MG: 325 TABLET, FILM COATED ORAL at 20:15

## 2020-12-27 RX ADMIN — Medication 1 G: at 09:24

## 2020-12-27 RX ADMIN — DEXAMETHASONE 6 MG: 4 TABLET ORAL at 05:45

## 2020-12-27 RX ADMIN — ATORVASTATIN CALCIUM 20 MG: 20 TABLET, FILM COATED ORAL at 16:58

## 2020-12-27 RX ADMIN — RIVAROXABAN 20 MG: 20 TABLET, FILM COATED ORAL at 16:58

## 2020-12-27 RX ADMIN — Medication 1 G: at 20:15

## 2020-12-27 ASSESSMENT — ENCOUNTER SYMPTOMS
MYALGIAS: 0
DEPRESSION: 0
FOCAL WEAKNESS: 0
CHILLS: 0
COUGH: 0
WEAKNESS: 1
FALLS: 1
NAUSEA: 0
VOMITING: 0
HEMOPTYSIS: 0
BLURRED VISION: 0
PALPITATIONS: 0
BRUISES/BLEEDS EASILY: 0
FEVER: 0

## 2020-12-27 NOTE — FACE TO FACE
Face to Face Supporting Documentation - Home Health    The encounter with this patient was in whole or in part the primary reason for home health admission.    Date of encounter:   Patient:                    MRN:                       YOB: 2021  Linda Goodwin  [id number]  1945     Home health to see patient for:  Skilled Nursing care for assessment, interventions & education, Physical Therapy evaluation and treatment and Occupational therapy evaluation and treatment    Skilled need for:  New Onset Medical Diagnosis R ankle fracture, covid19    Skilled nursing interventions to include:  Comment: debility    Homebound status evidenced by:  Need the aid of supportive devices such as crutches, canes, wheelchairs or walkers. Leaving home requires a considerable and taxing effort. There is a normal inability to leave the home.    Community Physician to provide follow up care: Pcp Pt States None     Optional Interventions? No      I certify the face to face encounter for this home health care referral meets the CMS requirements and the encounter/clinical assessment with the patient was, in whole, or in part, for the medical condition(s) listed above, which is the primary reason for home health care. Based on my clinical findings: the service(s) are medically necessary, support the need for home health care, and the homebound criteria are met.  I certify that this patient has had a face to face encounter by myself.  Pal Mercado M.D. - NPI: [id number]

## 2020-12-27 NOTE — PROGRESS NOTES
[facility] Medicine Daily Progress Note    Date of Service  12/27/2020    Chief Complaint  75 y.o. female admitted 12/21/2020 with   Chief Complaint   Patient presents with   • Ankle Pain     pt felt dizzy after using restroom, stood up, GLF, unable to get up r/t R ankle pain         Hospital Course    75 y.o. female who presented 12/21/2020 with history of morbid obesity, Afib on Xarelto and amiodarone and 3 falls over the last year resulting in fractures.  She presents with pain in her right ankle after ground-level fall at home.  Just prior to arrival she was sitting on the toilet, when she stood up she felt off balance when trying to pull up her pants.  She does not believe she lost consciousness and denies any head trauma.  She remembers her son coming to her and calling EMS.    In the emergency department she is found to have a trimalleolar fracture with subsequent reduction.  Orthopedic surgery is consulted, s/p ORIF R ankle 12/23. PT rec rehab.     She was found to be hypoxic and was placed on supplemental oxygen.  Her chest x-ray was negative though her COVID PCR was positive. Procalcitonin neg. Decadron started on 12/21-12/30.    Interval Problem Update  Patient was seen and examined at the bedside.  VS reviewed.   PT rec post acute placement. However patient does not want to go to rehab as she stated she has enough help at home. I talked to her son. Per son, he needs to go to work daytime and is unable to take care of her. He is worried that patient will get fracture again. CM following  [facility] ordered  Oxygen evaluation     Consultants/Specialty  orthopedics    Code Status  DNAR/DNI    Disposition  Rehab vs. [facility]    Review of Systems  Review of Systems   Constitutional: Positive for malaise/fatigue. Negative for chills and fever.   HENT: Negative for ear discharge.    Eyes: Negative for blurred vision.   Respiratory: Negative for cough and hemoptysis.    Cardiovascular: Negative for chest pain and  palpitations.   Gastrointestinal: Negative for nausea and vomiting.   Genitourinary: Negative for dysuria.   Musculoskeletal: Positive for falls. Negative for myalgias.   Skin: Negative for rash.   Neurological: Positive for weakness. Negative for focal weakness.   Endo/Heme/Allergies: Does not bruise/bleed easily.   Psychiatric/Behavioral: Negative for depression.        Physical Exam  Temp:  [36.3 °C (97.4 °F)-37.1 °C (98.7 °F)] 37.1 °C (98.7 °F)  Pulse:  [65-75] 75  Resp:  [18] 18  BP: (117-146)/(60-75) 117/62  SpO2:  [89 %-93 %] 89 %    Physical Exam  Vitals signs reviewed.   Constitutional:       Appearance: Normal appearance. She is obese.   HENT:      Head: Normocephalic and atraumatic.      Nose: Nose normal.      Mouth/Throat:      Pharynx: Oropharynx is clear.   Eyes:      Extraocular Movements: Extraocular movements intact.      Conjunctiva/sclera: Conjunctivae normal.      Pupils: Pupils are equal, round, and reactive to light.   Neck:      Musculoskeletal: Normal range of motion and neck supple.   Cardiovascular:      Rate and Rhythm: Normal rate and regular rhythm.      Pulses: Normal pulses.      Heart sounds: Normal heart sounds.   Pulmonary:      Effort: Pulmonary effort is normal.      Breath sounds: Normal breath sounds. No wheezing.      Comments: Oxygen supplements  Diminished bilateral breath sound  Abdominal:      General: Abdomen is flat. Bowel sounds are normal.      Palpations: Abdomen is soft.   Musculoskeletal: Normal range of motion.      Comments: Right lower extremity dressing dry, no drainage.    Skin:     General: Skin is warm and dry.   Neurological:      General: No focal deficit present.      Mental Status: She is alert and oriented to person, place, and time. Mental status is at baseline.   Psychiatric:         Mood and Affect: Mood normal.         Behavior: Behavior normal.         Fluids    Intake/Output Summary (Last 24 hours) at 12/27/2020 1215  Last data filed at 12/27/2020  0900  Gross per 24 hour   Intake 120 ml   Output 900 ml   Net -780 ml       Laboratory  Recent Labs     12/25/20  0740   WBC 11.3*   RBC 4.10*   HEMOGLOBIN 10.4*   HEMATOCRIT 35.0*   MCV 85.4   MCH 25.4*   MCHC 29.7*   RDW 50.2*   PLATELETCT 315   MPV 10.3     Recent Labs     12/25/20  0740   SODIUM 135   POTASSIUM 4.6   CHLORIDE 103   CO2 25   GLUCOSE 110*   BUN 39*   CREATININE 0.84   CALCIUM 9.9                   Imaging  DX-PORTABLE FLUOROSCOPY < 1 HOUR   Final Result      Intraoperative fluoroscopic spot images as described above.      DX-ANKLE 2- VIEWS RIGHT   Final Result      Intraoperative fluoroscopic spot images as described above.      DX-ANKLE 2- VIEWS RIGHT   Final Result      Improved alignment of ankle subluxation and trimalleolar fracture status post closed reduction and splinting.            DX-CHEST-PORTABLE (1 VIEW)   Final Result      Cardiac silhouette enlargement.            DX-ANKLE 2- VIEWS RIGHT   Final Result      Mildly improved alignment of ankle subluxation and trimalleolar fracture status post closed reduction.         DX-ANKLE 3+ VIEWS RIGHT   Final Result      Trimalleolar fracture with ankle subluxation.              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    Ortho consulted: s/p ORIF R ankle 12/23  Pain managements  Weight Bearing Status-TTWB x 6 weeks  PT/OT: post acute. Patient wants going home. She stated she has enough help at home. I talked to her son 12/27. Per son, he needs to go to work daytime and is unable to take care of her. He is worried that patient will get fracture again. CM following      Acute hypoxemic respiratory failure due to COVID-19 ([facility])  Assessment & Plan  Continue oxygen supplements, wean as tolerated  Procalcitonin negative  Start the Decadron 12/21-12/30  Supportive managements: RT protocol, judicious fluid, self prone    COVID-19  Assessment &  Plan  Procalcitonin normal, No indication for antibiotics  D-dimer:3.66: No chest pain, leg pain, swelling, no tachycardia. Will hold on doppler u/s and CTA at this point,   Management as above    A-fib (HCC)  Assessment & Plan  Rate controlled  Cont amiodarone and resume Xarelto 12/24    MAT (acute kidney injury) (HCC)  Assessment & Plan  Resolved after IVF.  Continue monitoring      Hyponatremia  Assessment & Plan  Resolved, asymptomatic  On salt tablets  continue monitoring    Leukocytosis  Assessment & Plan  Procalcitonin normal, likely secondary to steroid  Continue monitoring    Falls frequently  Assessment & Plan  Associated with severe physical deconditioning.  Follow-up PT eval    Morbid obesity (HCC)  Assessment & Plan  BMI 47  diet and lifestyle modification recommended    Chronic anticoagulation  Assessment & Plan  On hold prior surgery.  Discussed with ortho scott Egan to resume it on 12/24       VTE prophylaxis: xarelto

## 2020-12-27 NOTE — PROGRESS NOTES
Patient seen and examined  Slow with PT    /75   Pulse 66   Temp 36.9 °C (98.5 °F)   Resp 18   Ht 1.524 m (5')   Wt 109.4 kg (241 lb 2.9 oz)   SpO2 90%     Recent Labs     12/24/20  0712 12/25/20  0740   WBC 11.1* 11.3*   RBC 4.07* 4.10*   HEMOGLOBIN 10.5* 10.4*   HEMATOCRIT 34.4* 35.0*   MCV 84.5 85.4   MCH 25.8* 25.4*   MCHC 30.5* 29.7*   RDW 49.2 50.2*   PLATELETCT 272 315   MPV 10.7 10.3       No acute distress  Dressing clean dry and intact  Neurovascularly intact    POD#4  S/P ORIF ankle    Plan:  DVT Prophylaxis- per medicine  Weight Bearing Status-TTWB RLE x 6 weeks  PT/OT  Case Coordination

## 2020-12-27 NOTE — PROGRESS NOTES
Received bedside report from RN, pt care assumed, VSS on 2.5L NC, pt assessment complete. Pt AAOx4, 0/10 pain at this time. No signs of acute distress noted at this time. POC discussed with pt and verbalizes no questions. Pt denies any additional needs at this time. Bed in lowest position, pt educated on fall risk and verbalized understanding, call light within reach, hourly rounding initiated.

## 2020-12-27 NOTE — THERAPY
"Physical Therapy Contact Note    PT treatment attempted. Discussed with RN prior to session regarding patient behavior and prior refusals to participate with therapy; RN suggested asking patient to demonstrate abilities for DC home instead of telling patient what to do. Patient was able to state \"no weight through leg\" when asked to verbalize TTWB RLE precautions. PT attempted to demonstrate sit to stand and stand pivot transfer with FWW and WC prior to having patient attempt but patient refused to open eyes for demonstration. Patient demonstrated increasing agitation, stating \"I don't need you, I just need to go home, and for you to bring me the handout like I asked you last time so I can go home and learn what to do!\" Attempted education regarding role of PT in DC planning, progressing mobility, and importance of safety with DC home several times; patient refused to attempt OOB activity, stated \"I'm not doing it, I'm not getting up.\" Patient demonstrated poor insight and no ability to reason, stated her son will help her. Will re attempt as appropriate and able. Given limited social support, refusal to attempt OOB activity, and patient presentation, continue to recommend post acute placement however anticipate patient will refuse.    Kaylee Cohn, PT, DPT  [phone]      "

## 2020-12-28 ENCOUNTER — APPOINTMENT (OUTPATIENT)
Dept: RADIOLOGY | Facility: MEDICAL CENTER | Age: 75
DRG: 492 | End: 2020-12-28
Attending: STUDENT IN AN ORGANIZED HEALTH CARE EDUCATION/TRAINING PROGRAM
Payer: COMMERCIAL

## 2020-12-28 PROCEDURE — A9270 NON-COVERED ITEM OR SERVICE: HCPCS | Performed by: ORTHOPAEDIC SURGERY

## 2020-12-28 PROCEDURE — 700111 HCHG RX REV CODE 636 W/ 250 OVERRIDE (IP): Performed by: STUDENT IN AN ORGANIZED HEALTH CARE EDUCATION/TRAINING PROGRAM

## 2020-12-28 PROCEDURE — 700102 HCHG RX REV CODE 250 W/ 637 OVERRIDE(OP): Performed by: ORTHOPAEDIC SURGERY

## 2020-12-28 PROCEDURE — 770020 HCHG ROOM/CARE - TELE (206)

## 2020-12-28 PROCEDURE — A9270 NON-COVERED ITEM OR SERVICE: HCPCS | Performed by: STUDENT IN AN ORGANIZED HEALTH CARE EDUCATION/TRAINING PROGRAM

## 2020-12-28 PROCEDURE — 99232 SBSQ HOSP IP/OBS MODERATE 35: CPT | Performed by: STUDENT IN AN ORGANIZED HEALTH CARE EDUCATION/TRAINING PROGRAM

## 2020-12-28 PROCEDURE — 700102 HCHG RX REV CODE 250 W/ 637 OVERRIDE(OP): Performed by: STUDENT IN AN ORGANIZED HEALTH CARE EDUCATION/TRAINING PROGRAM

## 2020-12-28 PROCEDURE — 71045 X-RAY EXAM CHEST 1 VIEW: CPT

## 2020-12-28 PROCEDURE — 700102 HCHG RX REV CODE 250 W/ 637 OVERRIDE(OP): Performed by: NURSE PRACTITIONER

## 2020-12-28 PROCEDURE — A9270 NON-COVERED ITEM OR SERVICE: HCPCS | Performed by: NURSE PRACTITIONER

## 2020-12-28 RX ORDER — FUROSEMIDE 10 MG/ML
40 INJECTION INTRAMUSCULAR; INTRAVENOUS
Status: DISCONTINUED | OUTPATIENT
Start: 2020-12-28 | End: 2020-12-31 | Stop reason: HOSPADM

## 2020-12-28 RX ADMIN — Medication 1 G: at 20:24

## 2020-12-28 RX ADMIN — FLUTICASONE PROPIONATE 100 MCG: 50 SPRAY, METERED NASAL at 05:00

## 2020-12-28 RX ADMIN — Medication 1 G: at 09:22

## 2020-12-28 RX ADMIN — ATORVASTATIN CALCIUM 20 MG: 20 TABLET, FILM COATED ORAL at 16:19

## 2020-12-28 RX ADMIN — RIVAROXABAN 20 MG: 20 TABLET, FILM COATED ORAL at 16:18

## 2020-12-28 RX ADMIN — DEXAMETHASONE 6 MG: 4 TABLET ORAL at 05:00

## 2020-12-28 RX ADMIN — AMIODARONE HYDROCHLORIDE 200 MG: 200 TABLET ORAL at 05:00

## 2020-12-28 RX ADMIN — ACETAMINOPHEN 650 MG: 325 TABLET, FILM COATED ORAL at 01:38

## 2020-12-28 RX ADMIN — FUROSEMIDE 40 MG: 10 INJECTION, SOLUTION INTRAMUSCULAR; INTRAVENOUS at 11:47

## 2020-12-28 ASSESSMENT — ENCOUNTER SYMPTOMS
WEAKNESS: 1
BLURRED VISION: 0
PALPITATIONS: 0
COUGH: 0
VOMITING: 0
FEVER: 0
NAUSEA: 0
FOCAL WEAKNESS: 0
HEMOPTYSIS: 0
BRUISES/BLEEDS EASILY: 0
CHILLS: 0
MYALGIAS: 0
FALLS: 1
DEPRESSION: 0

## 2020-12-28 ASSESSMENT — PAIN DESCRIPTION - PAIN TYPE: TYPE: ACUTE PAIN

## 2020-12-28 NOTE — PROGRESS NOTES
Bedside report received from day nurse. Assumed care of patient. Pt. resting comfortably without any sign of distress. A&Ox4, on 2L NC, no complaints at this time. POC reviewed and white board updated, Tele box on, Call light in reach, Bed locked in lowest position.

## 2020-12-28 NOTE — PROGRESS NOTES
Received report from RN. Pt. JAQUELIN, A&Ox4, pt. Refused to answer if she needed anything upon assessment. Pt. Denies pain. Will continue to monitor.

## 2020-12-28 NOTE — PROGRESS NOTES
[facility] Medicine Daily Progress Note    Date of Service  12/28/2020    Chief Complaint  75 y.o. female admitted 12/21/2020 with   Chief Complaint   Patient presents with   • Ankle Pain     pt felt dizzy after using restroom, stood up, GLF, unable to get up r/t R ankle pain         Hospital Course    75 y.o. female who presented 12/21/2020 with history of morbid obesity, Afib on Xarelto and amiodarone and 3 falls over the last year resulting in fractures.  She presents with pain in her right ankle after ground-level fall at home.  Just prior to arrival she was sitting on the toilet, when she stood up she felt off balance when trying to pull up her pants.  She does not believe she lost consciousness and denies any head trauma.  She remembers her son coming to her and calling EMS.    In the emergency department she is found to have a trimalleolar fracture with subsequent reduction.  Orthopedic surgery is consulted, s/p ORIF R ankle 12/23. PT rec rehab. However patient refuses and stated she has enough help at home. I spoked to her son. Per son, he needs to go to work daytime and is unable to take care of her. He is worried that patient will get fracture again. However patient is not cooperative and does not want to participate in any PT evaluation and O2 evaluation. CM following.     She was found to be hypoxic and was placed on supplemental oxygen. Procalcitonin neg. Decadron started on 12/21-12/30. Still needs 2-3L O2. Lasix 40mg iv daily started.    Interval Problem Update  Patient was seen and examined at the bedside.  VS reviewed.   PT rec post acute placement. However patient does not want to go to rehab. However she does not want to participate in any PT evaluation. She has very minimal help at home as her son goes to work.  CM following    Consultants/Specialty  orthopedics    Code Status  DNAR/DNI    Disposition  [facility] vs[facility]    Review of Systems  Review of Systems   Constitutional: Positive for  malaise/fatigue. Negative for chills and fever.   HENT: Negative for ear discharge.    Eyes: Negative for blurred vision.   Respiratory: Negative for cough and hemoptysis.    Cardiovascular: Negative for chest pain and palpitations.   Gastrointestinal: Negative for nausea and vomiting.   Genitourinary: Negative for dysuria.   Musculoskeletal: Positive for falls. Negative for myalgias.   Skin: Negative for rash.   Neurological: Positive for weakness. Negative for focal weakness.   Endo/Heme/Allergies: Does not bruise/bleed easily.   Psychiatric/Behavioral: Negative for depression.        Physical Exam  Temp:  [35.9 °C (96.6 °F)-37 °C (98.6 °F)] 37 °C (98.6 °F)  Pulse:  [58-74] 64  Resp:  [16-18] 18  BP: (100-152)/(56-78) 150/78  SpO2:  [82 %-96 %] 92 %    Physical Exam  Vitals signs reviewed.   Constitutional:       Appearance: Normal appearance. She is obese.   HENT:      Head: Normocephalic and atraumatic.      Nose: Nose normal.      Mouth/Throat:      Pharynx: Oropharynx is clear.   Eyes:      Extraocular Movements: Extraocular movements intact.      Conjunctiva/sclera: Conjunctivae normal.      Pupils: Pupils are equal, round, and reactive to light.   Neck:      Musculoskeletal: Normal range of motion and neck supple.   Cardiovascular:      Rate and Rhythm: Normal rate and regular rhythm.      Pulses: Normal pulses.      Heart sounds: Normal heart sounds.   Pulmonary:      Effort: Pulmonary effort is normal.      Breath sounds: Normal breath sounds. No wheezing.      Comments: Oxygen supplements  Diminished bilateral breath sound  Abdominal:      General: Abdomen is flat. Bowel sounds are normal.      Palpations: Abdomen is soft.   Musculoskeletal: Normal range of motion.      Comments: Right lower extremity dressing dry, no drainage.    Skin:     General: Skin is warm and dry.   Neurological:      General: No focal deficit present.      Mental Status: She is alert and oriented to person, place, and time. Mental  status is at baseline.   Psychiatric:         Mood and Affect: Mood normal.         Behavior: Behavior normal.         Fluids    Intake/Output Summary (Last 24 hours) at 12/28/2020 1050  Last data filed at 12/27/2020 2018  Gross per 24 hour   Intake 120 ml   Output 750 ml   Net -630 ml       Laboratory                        Imaging  DX-PORTABLE FLUOROSCOPY < 1 HOUR   Final Result      Intraoperative fluoroscopic spot images as described above.      DX-ANKLE 2- VIEWS RIGHT   Final Result      Intraoperative fluoroscopic spot images as described above.      DX-ANKLE 2- VIEWS RIGHT   Final Result      Improved alignment of ankle subluxation and trimalleolar fracture status post closed reduction and splinting.            DX-CHEST-PORTABLE (1 VIEW)   Final Result      Cardiac silhouette enlargement.            DX-ANKLE 2- VIEWS RIGHT   Final Result      Mildly improved alignment of ankle subluxation and trimalleolar fracture status post closed reduction.         DX-ANKLE 3+ VIEWS RIGHT   Final Result      Trimalleolar fracture with ankle subluxation.              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    Ortho consulted: s/p ORIF R ankle 12/23  Pain managements  Weight Bearing Status-TTWB x 6 weeks  PT/OT: post acute. Patient wants going home. She stated she has enough help at home. I talked to her son 12/27. Per son, he needs to go to work daytime and is unable to take care of her. He is worried that patient will get fracture again. CM following      Acute hypoxemic respiratory failure due to COVID-19 ([facility])  Assessment & Plan  Continue oxygen supplements, wean as tolerated  Procalcitonin negative  Start the Decadron 12/21-12/30  Supportive managements: RT protocol, IS, judicious fluid, self prone  Start lasix 40mg iv daily, I/O, CXR follow up (ordered on 12/28)  Wean O2 as tolerated    COVID-19  Assessment &  Plan  Procalcitonin normal, No indication for antibiotics  D-dimer:3.66: No chest pain, leg pain, swelling, no tachycardia. Will hold on doppler u/s and CTA at this point,   Management as above    A-fib (HCC)  Assessment & Plan  Rate controlled  Cont amiodarone and resume Xarelto 12/24    MAT (acute kidney injury) ([facility])  Assessment & Plan  Resolved after IVF.  Continue monitoring      Hyponatremia  Assessment & Plan  Resolved, asymptomatic  On salt tablets  continue monitoring    Leukocytosis  Assessment & Plan  Procalcitonin normal, likely secondary to steroid  Continue monitoring    Falls frequently  Assessment & Plan  Associated with severe physical deconditioning.  Follow-up PT eval, however patient is not cooperative    Morbid obesity (HCC)  Assessment & Plan  BMI 47  diet and lifestyle modification recommended    Chronic anticoagulation  Assessment & Plan  On hold prior surgery.  Discussed with ortho scott Egan to resume it on 12/24       VTE prophylaxis: xarelto

## 2020-12-28 NOTE — THERAPY
Missed Therapy     Patient Name: Linda Goodwin  Age:  75 y.o., Sex:  female  Medical Record #: [id number]  Today's Date: 12/28/2020    Discussed missed therapy with Nsg & PT       12/28/20 3942   Interdisciplinary Plan of Care Collaboration   Collaboration Comments OT eval attempted, again.  Pt was not willing to participate, was not receptive to any education, was not rational with any attempt at explaining consequences of bedrest & then became belligerent.  OT informed pt we would not be returning as she repeatedly has refused to participate.

## 2020-12-28 NOTE — DISCHARGE PLANNING
Anticipated Discharge Disposition: TBD    Action: LSW attempted to contact Pt to discuss discharge planning, Pt did not answer her room phone.     Barriers to Discharge: PT refusing to do PT/OT evals and refusing to consider SNF, HH, and DME.     Plan: LSW to continue attempting to speak to Pt.

## 2020-12-29 LAB
ANION GAP SERPL CALC-SCNC: 6 MMOL/L (ref 7–16)
BASOPHILS # BLD AUTO: 0.2 % (ref 0–1.8)
BASOPHILS # BLD: 0.02 K/UL (ref 0–0.12)
BUN SERPL-MCNC: 32 MG/DL (ref 8–22)
CALCIUM SERPL-MCNC: 9.2 MG/DL (ref 8.5–10.5)
CHLORIDE SERPL-SCNC: 100 MMOL/L (ref 96–112)
CO2 SERPL-SCNC: 27 MMOL/L (ref 20–33)
CREAT SERPL-MCNC: 0.79 MG/DL (ref 0.5–1.4)
EOSINOPHIL # BLD AUTO: 0.05 K/UL (ref 0–0.51)
EOSINOPHIL NFR BLD: 0.5 % (ref 0–6.9)
ERYTHROCYTE [DISTWIDTH] IN BLOOD BY AUTOMATED COUNT: 53.5 FL (ref 35.9–50)
GLUCOSE SERPL-MCNC: 83 MG/DL (ref 65–99)
HCT VFR BLD AUTO: 34.5 % (ref 37–47)
HGB BLD-MCNC: 10.3 G/DL (ref 12–16)
IMM GRANULOCYTES # BLD AUTO: 0.28 K/UL (ref 0–0.11)
IMM GRANULOCYTES NFR BLD AUTO: 3 % (ref 0–0.9)
LYMPHOCYTES # BLD AUTO: 1.2 K/UL (ref 1–4.8)
LYMPHOCYTES NFR BLD: 12.7 % (ref 22–41)
MCH RBC QN AUTO: 25.4 PG (ref 27–33)
MCHC RBC AUTO-ENTMCNC: 29.9 G/DL (ref 33.6–35)
MCV RBC AUTO: 85 FL (ref 81.4–97.8)
MONOCYTES # BLD AUTO: 1.02 K/UL (ref 0–0.85)
MONOCYTES NFR BLD AUTO: 10.8 % (ref 0–13.4)
NEUTROPHILS # BLD AUTO: 6.89 K/UL (ref 2–7.15)
NEUTROPHILS NFR BLD: 72.8 % (ref 44–72)
NRBC # BLD AUTO: 0 K/UL
NRBC BLD-RTO: 0 /100 WBC
PLATELET # BLD AUTO: 362 K/UL (ref 164–446)
PMV BLD AUTO: 10.1 FL (ref 9–12.9)
POTASSIUM SERPL-SCNC: 4.6 MMOL/L (ref 3.6–5.5)
PROCALCITONIN SERPL-MCNC: <0.05 NG/ML
RBC # BLD AUTO: 4.06 M/UL (ref 4.2–5.4)
SODIUM SERPL-SCNC: 133 MMOL/L (ref 135–145)
WBC # BLD AUTO: 9.5 K/UL (ref 4.8–10.8)

## 2020-12-29 PROCEDURE — 84145 PROCALCITONIN (PCT): CPT

## 2020-12-29 PROCEDURE — 770020 HCHG ROOM/CARE - TELE (206)

## 2020-12-29 PROCEDURE — 700102 HCHG RX REV CODE 250 W/ 637 OVERRIDE(OP): Performed by: STUDENT IN AN ORGANIZED HEALTH CARE EDUCATION/TRAINING PROGRAM

## 2020-12-29 PROCEDURE — 36415 COLL VENOUS BLD VENIPUNCTURE: CPT

## 2020-12-29 PROCEDURE — 99232 SBSQ HOSP IP/OBS MODERATE 35: CPT | Performed by: INTERNAL MEDICINE

## 2020-12-29 PROCEDURE — 700102 HCHG RX REV CODE 250 W/ 637 OVERRIDE(OP): Performed by: INTERNAL MEDICINE

## 2020-12-29 PROCEDURE — A9270 NON-COVERED ITEM OR SERVICE: HCPCS | Performed by: STUDENT IN AN ORGANIZED HEALTH CARE EDUCATION/TRAINING PROGRAM

## 2020-12-29 PROCEDURE — 700111 HCHG RX REV CODE 636 W/ 250 OVERRIDE (IP): Performed by: STUDENT IN AN ORGANIZED HEALTH CARE EDUCATION/TRAINING PROGRAM

## 2020-12-29 PROCEDURE — 80048 BASIC METABOLIC PNL TOTAL CA: CPT

## 2020-12-29 PROCEDURE — 700102 HCHG RX REV CODE 250 W/ 637 OVERRIDE(OP): Performed by: NURSE PRACTITIONER

## 2020-12-29 PROCEDURE — A9270 NON-COVERED ITEM OR SERVICE: HCPCS | Performed by: INTERNAL MEDICINE

## 2020-12-29 PROCEDURE — 85025 COMPLETE CBC W/AUTO DIFF WBC: CPT

## 2020-12-29 PROCEDURE — A9270 NON-COVERED ITEM OR SERVICE: HCPCS | Performed by: NURSE PRACTITIONER

## 2020-12-29 RX ORDER — LORATADINE 10 MG/1
10 TABLET ORAL DAILY
Status: DISCONTINUED | OUTPATIENT
Start: 2020-12-29 | End: 2020-12-31 | Stop reason: HOSPADM

## 2020-12-29 RX ADMIN — FLUTICASONE PROPIONATE 100 MCG: 50 SPRAY, METERED NASAL at 05:38

## 2020-12-29 RX ADMIN — AMIODARONE HYDROCHLORIDE 200 MG: 200 TABLET ORAL at 05:39

## 2020-12-29 RX ADMIN — LORATADINE 10 MG: 10 TABLET ORAL at 13:11

## 2020-12-29 RX ADMIN — RIVAROXABAN 20 MG: 20 TABLET, FILM COATED ORAL at 16:34

## 2020-12-29 RX ADMIN — Medication 1 G: at 08:59

## 2020-12-29 RX ADMIN — FUROSEMIDE 40 MG: 10 INJECTION, SOLUTION INTRAMUSCULAR; INTRAVENOUS at 05:39

## 2020-12-29 RX ADMIN — ATORVASTATIN CALCIUM 20 MG: 20 TABLET, FILM COATED ORAL at 16:34

## 2020-12-29 RX ADMIN — DEXAMETHASONE 6 MG: 4 TABLET ORAL at 05:39

## 2020-12-29 RX ADMIN — Medication 1 G: at 19:59

## 2020-12-29 ASSESSMENT — ENCOUNTER SYMPTOMS
SHORTNESS OF BREATH: 1
WEAKNESS: 1
FALLS: 1

## 2020-12-29 ASSESSMENT — PAIN DESCRIPTION - PAIN TYPE: TYPE: ACUTE PAIN

## 2020-12-29 NOTE — THERAPY
"Missed Therapy     Patient Name: Linda Goodwin  Age:  75 y.o., Sex:  female  Medical Record #: [id number]  Today's Date: 12/28/2020 12/28/20 9947   Interdisciplinary Plan of Care Collaboration   Collaboration Comments PT tx attempted with OT. Pt refusing to participate despite attempts of encouragement. Pt using foul language and referencing that the \"doctor won't be able to hold me hostage\". Pt stating she sleeps in sit>stand recliner at home so she won't have to negotiate bed mob and plans to just transfer to a . However, not willing to demo safe transfer technique for DC. Please reference PT evaluation performed on 12/26 for DC recommendations. Plan to re-attempt PT tx once more as able, if pt continues to refuse acute PT services will be discontinued given no likely benefit.     "

## 2020-12-29 NOTE — PROGRESS NOTES
[facility] Medicine Daily Progress Note    Date of Service  12/29/2020    Chief Complaint  75 y.o. female admitted 12/21/2020 with   Chief Complaint   Patient presents with   • Ankle Pain     pt felt dizzy after using restroom, stood up, GLF, unable to get up r/t R ankle pain         Hospital Course    75 y.o. female who presented 12/21/2020 with history of morbid obesity, Afib on Xarelto and amiodarone and 3 falls over the last year resulting in fractures.  She presents with pain in her right ankle after ground-level fall at home.  Just prior to arrival she was sitting on the toilet, when she stood up she felt off balance when trying to pull up her pants.  She does not believe she lost consciousness and denies any head trauma.  She remembers her son coming to her and calling EMS.    In the emergency department she is found to have a trimalleolar fracture with subsequent reduction.  Orthopedic surgery is consulted, s/p ORIF R ankle 12/23. PT rec rehab. However patient refuses and stated she has enough help at home. I spoked to her son. Per son, he needs to go to work daytime and is unable to take care of her. He is worried that patient will get fracture again. However patient is not cooperative and does not want to participate in any PT evaluation and O2 evaluation. CM following.     She was found to be hypoxic and was placed on supplemental oxygen. Procalcitonin neg. Decadron started on 12/21-12/30. Still needs 2-3L O2. Lasix 40mg iv daily started.    Interval Problem Update  Patient was seen and examined at bedside.  No acute events overnight. Patient is resting comfortably in bed and in no acute distress.     Patient continues to refuse SNF placement. Patient is A&O x3 and understands risks refusing SNF placement  and discharging home including severe injury, death.   Patient has increased oxygen requirement over last 25 hours from 2 to 5L.     Consultants/Specialty  orthopedics    Code  Status  DNAR/DNI    Disposition  [facility] in next 24-48 hours.     Review of Systems  Review of Systems   Constitutional: Positive for malaise/fatigue.   Respiratory: Positive for shortness of breath.    Musculoskeletal: Positive for falls and joint pain.   Neurological: Positive for weakness.        Physical Exam  Temp:  [36.1 °C (97 °F)-37 °C (98.6 °F)] 36.5 °C (97.7 °F)  Pulse:  [71-75] 73  Resp:  [18-20] 19  BP: (120-152)/(65-84) 133/84  SpO2:  [90 %-92 %] 92 %    Physical Exam  Vitals signs reviewed.   Constitutional:       General: She is not in acute distress.     Appearance: Normal appearance. She is obese. She is not toxic-appearing.   HENT:      Head: Normocephalic and atraumatic.      Right Ear: External ear normal.      Left Ear: External ear normal.      Nose: Nose normal. No congestion.      Mouth/Throat:      Pharynx: Oropharynx is clear. No oropharyngeal exudate.   Eyes:      Extraocular Movements: Extraocular movements intact.      Conjunctiva/sclera: Conjunctivae normal.      Pupils: Pupils are equal, round, and reactive to light.   Cardiovascular:      Rate and Rhythm: Normal rate and regular rhythm.      Pulses: Normal pulses.      Heart sounds: Normal heart sounds.   Pulmonary:      Effort: Pulmonary effort is normal.      Breath sounds: Normal breath sounds. No wheezing.      Comments: Diminished breath sounds in lower lung base bilaterally  Abdominal:      General: Abdomen is flat. Bowel sounds are normal.      Palpations: Abdomen is soft.      Tenderness: There is no abdominal tenderness. There is no guarding or rebound.   Musculoskeletal: Normal range of motion.         General: Tenderness (right ankle tender to palpation, Tender with PROM) present.      Comments: Right lower extremity dressing dry, no drainage.    Skin:     General: Skin is warm and dry.      Coloration: Skin is not jaundiced.   Neurological:      General: No focal deficit present.      Mental Status: She is alert and oriented  to person, place, and time. Mental status is at baseline.   Psychiatric:         Mood and Affect: Mood normal.         Behavior: Behavior normal.         Fluids    Intake/Output Summary (Last 24 hours) at 12/29/2020 1228  Last data filed at 12/28/2020 1800  Gross per 24 hour   Intake --   Output 1200 ml   Net -1200 ml       Laboratory  Recent Labs     12/29/20  0518   WBC 9.5   RBC 4.06*   HEMOGLOBIN 10.3*   HEMATOCRIT 34.5*   MCV 85.0   MCH 25.4*   MCHC 29.9*   RDW 53.5*   PLATELETCT 362   MPV 10.1     Recent Labs     12/29/20  0518   SODIUM 133*   POTASSIUM 4.6   CHLORIDE 100   CO2 27   GLUCOSE 83   BUN 32*   CREATININE 0.79   CALCIUM 9.2                   Imaging  DX-CHEST-PORTABLE (1 VIEW)   Final Result      1.  Worsening hypoinflation with increasing multifocal peripheral infiltrates, compatible with covid 19 pneumonia.   2.  Stable cardiomegaly.      DX-PORTABLE FLUOROSCOPY < 1 HOUR   Final Result      Intraoperative fluoroscopic spot images as described above.      DX-ANKLE 2- VIEWS RIGHT   Final Result      Intraoperative fluoroscopic spot images as described above.      DX-ANKLE 2- VIEWS RIGHT   Final Result      Improved alignment of ankle subluxation and trimalleolar fracture status post closed reduction and splinting.            DX-CHEST-PORTABLE (1 VIEW)   Final Result      Cardiac silhouette enlargement.            DX-ANKLE 2- VIEWS RIGHT   Final Result      Mildly improved alignment of ankle subluxation and trimalleolar fracture status post closed reduction.         DX-ANKLE 3+ VIEWS RIGHT   Final Result      Trimalleolar fracture with ankle subluxation.              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    Ortho consulted: s/p ORIF R ankle 12/23  Pain managements  Weight Bearing Status-TTWB x 6 weeks  PT/OT: post acute.  Patient refuses SNF and is aware of consequences of discharging home including serious  injury, death. Patient is A&O x3 and able to make own decisions.   Will discharge to home with home health when stable.       Acute hypoxemic respiratory failure due to COVID-19 ([facility])  Assessment & Plan  Continue oxygen supplements, wean as tolerated  Procalcitonin negative  Start the Decadron 12/21-12/30  Supportive managements: RT protocol, IS, judicious fluid, self prone  Start lasix 40mg iv daily, I/O, CXR follow up (ordered on 12/28)  Wean O2 as tolerated    COVID-19  Assessment & Plan  Procalcitonin normal, No indication for antibiotics  Trend inflammatory markers    A-fib ([facility])  Assessment & Plan  Rate controlled  Cont amiodarone and resume Xarelto 12/24    MAT (acute kidney injury) ([facility])  Assessment & Plan  Resolved after IVF.  Continue monitoring      Hyponatremia  Assessment & Plan  Resolved, asymptomatic  On salt tablets  continue monitoring    Leukocytosis  Assessment & Plan  Procalcitonin normal, likely secondary to steroid  Continue monitoring    Falls frequently  Assessment & Plan  Associated with severe physical deconditioning.  Follow-up PT eval, however patient is not cooperative    Morbid obesity ([facility])  Assessment & Plan  BMI 47  diet and lifestyle modification recommended    Chronic anticoagulation  Assessment & Plan  On hold prior surgery.  Discussed with ortho scott Egan to resume it on 12/24       VTE prophylaxis: xarelto

## 2020-12-29 NOTE — PROGRESS NOTES
Report received at bedside from Day RN, pt care assumed, tele box on. Pt aaox4, no signs of distress noted at this time. Patient resting comfortably in bed. POC discussed with pt and verbalizes no questions. Pt c/o of no pain. Pt denies any additional needs at this time. Bed in lowest position, pt educated on fall risk and verbalized understanding, call light within reach, bed alarm plugged in and on.

## 2020-12-29 NOTE — PROGRESS NOTES
Report received from RN. Pt. Resting comfortably. A&Ox4, YUSEF, robbys. Pt. Denies pain and questions for RN. Will continue to monitor.

## 2020-12-29 NOTE — FACE TO FACE
Face to Face Note  -  Durable Medical Equipment    Eliezer Chawla D.O. - NPI: [id number]  I certify that this patient is under my care and that they have had a durable medical equipment(DME)face to face encounter by myself that meets the physician DME face-to-face encounter requirements with this patient on:    Date of encounter:   Patient:                    MRN:                       YOB: 2021  Linda Goodwin  [id number]  1945     The encounter with the patient was in whole, or in part, for the following medical condition, which is the primary reason for durable medical equipment:  Other - Acute hypoxemic respiratory failure secondary to covid    I certify that, based on my findings, the following durable medical equipment is medically necessary:  Oxygen, Walkers, Wheel Chair and Other DME Equipment - bed side commode.    HOME O2 Saturation Measurements:(Values must be present for Home Oxygen orders)        With liters of O2: 3,     ,         My Clinical findings support the need for the above equipment due to:  Frequent Falls, Other - Acute hypoxemic respiratory failure secondary to covid    Supporting Symptoms:     ------------------------------------------------------------------------------------------------------------------    Face to Face Supporting Documentation - Home Health    The encounter with this patient was in whole or in part the primary reason for home health admission.    Date of encounter:   Patient:                    MRN:                       YOB: 2021  Linda Goodwin  [id number]  1945     Home health to see patient for:  Skilled Nursing care for assessment, interventions & education, Physical Therapy evaluation and treatment and Occupational therapy evaluation and treatment    Skilled need for:  Recent Deterioration of Health Status  due to fall and Acute hypoxemic respiratory failure secondary to covid    Skilled  nursing interventions to include:  Comment:      Homebound evidenced status by:  Need the aid of supportive devices such as crutches, canes, wheelchairs or walkers or Have a condition such that leaving his or her home is medically contraindicated. Leaving home must require a considerable and taxing effort. There must exist a normal inability to leave the home.    Community Physician to provide follow up care: Pcp Pt States None     Optional Interventions    Wound information & treatment:    Home Infusion Therapy orders:    Line/Drain/Airway:    I certify the face to face encounter for this home care referral meets the CMS requirements and the encounter/clinical assessment with the patient was, in whole, or in part, for the medical condition(s) listed above, which is the primary reason for home health care. Based on my clinical findings: the service(s) are medically necessary, support the need for home health care, and the homebound criteria are met.  I certify that this patient has had a face to face encounter by myself.  Eliezer Chawla D.O. - NPI: [id number]    *Debility, frailty and advanced age in the absence of an acute deterioration or exacerbation of a condition do not qualify a patient for home health.

## 2020-12-29 NOTE — PROGRESS NOTES
Orthopaedic Progress Note    Interval changes:  Patient doing well   Cleared for DC home by ortho pending medicine clearance    ROS - Patient denies any new issues.  Pain well controlled.    /84   Pulse 73   Temp 36.5 °C (97.7 °F) (Temporal)   Resp 19   Ht 1.524 m (5')   Wt 109.4 kg (241 lb 2.9 oz)   SpO2 92%       Patient seen and examined  No acute distress  Breathing non labored  RRR  RLE in short leg splint CDI, DNVI, moves all toes cap refill <2 sec,     Recent Labs     12/29/20  0518   WBC 9.5   RBC 4.06*   HEMOGLOBIN 10.3*   HEMATOCRIT 34.5*   MCV 85.0   MCH 25.4*   MCHC 29.9*   RDW 53.5*   PLATELETCT 362   MPV 10.1       Active Hospital Problems    Diagnosis   • Acute hypoxemic respiratory failure due to COVID-19 ([facility]) [U07.1, J96.01]     Priority: High   • COVID-19 [U07.1]     Priority: Medium   • A-fib ([facility]) [I48.91]   • Leukocytosis [D72.829]   • Hyponatremia [E87.1]   • MAT (acute kidney injury) (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) [E66.01]   • Falls frequently [R29.6]       Assessment/Plan:  Cleared for DC home by ortho pending medicine clearance  POD#6 S/P:  1.  Open reduction and internal fixation of right bimalleolar ankle fracture.  2.  Open reduction and internal fixation of syndesmosis  Wt bearing status - TTWB  Wound care/Drains - splint left in place  Future Procedures - none planned    Sutures/Staples out- 10-14 days post operatively  PT/OT-initiated  Antibiotics: completed  DVT Prophylaxis- TEDS/SCDs/Foot pumps  Chauhan-none  Case Coordination for Discharge Planning - Disposition home

## 2020-12-30 PROBLEM — N17.9 AKI (ACUTE KIDNEY INJURY) (HCC): Status: RESOLVED | Noted: 2020-12-22 | Resolved: 2020-12-30

## 2020-12-30 LAB
ALBUMIN SERPL BCP-MCNC: 2.8 G/DL (ref 3.2–4.9)
ALBUMIN/GLOB SERPL: 0.8 G/DL
ALP SERPL-CCNC: 61 U/L (ref 30–99)
ALT SERPL-CCNC: 17 U/L (ref 2–50)
ANION GAP SERPL CALC-SCNC: 10 MMOL/L (ref 7–16)
AST SERPL-CCNC: 20 U/L (ref 12–45)
BASOPHILS # BLD AUTO: 0.2 % (ref 0–1.8)
BASOPHILS # BLD: 0.03 K/UL (ref 0–0.12)
BILIRUB SERPL-MCNC: 0.3 MG/DL (ref 0.1–1.5)
BUN SERPL-MCNC: 34 MG/DL (ref 8–22)
CALCIUM SERPL-MCNC: 9.4 MG/DL (ref 8.5–10.5)
CHLORIDE SERPL-SCNC: 98 MMOL/L (ref 96–112)
CO2 SERPL-SCNC: 28 MMOL/L (ref 20–33)
CREAT SERPL-MCNC: 0.8 MG/DL (ref 0.5–1.4)
CRP SERPL HS-MCNC: 1.57 MG/DL (ref 0–0.75)
D DIMER PPP IA.FEU-MCNC: 1.9 UG/ML (FEU) (ref 0–0.5)
EOSINOPHIL # BLD AUTO: 0.03 K/UL (ref 0–0.51)
EOSINOPHIL NFR BLD: 0.2 % (ref 0–6.9)
ERYTHROCYTE [DISTWIDTH] IN BLOOD BY AUTOMATED COUNT: 53.1 FL (ref 35.9–50)
GLOBULIN SER CALC-MCNC: 3.6 G/DL (ref 1.9–3.5)
GLUCOSE SERPL-MCNC: 99 MG/DL (ref 65–99)
HCT VFR BLD AUTO: 38.3 % (ref 37–47)
HGB BLD-MCNC: 11.3 G/DL (ref 12–16)
IMM GRANULOCYTES # BLD AUTO: 0.32 K/UL (ref 0–0.11)
IMM GRANULOCYTES NFR BLD AUTO: 2.6 % (ref 0–0.9)
LYMPHOCYTES # BLD AUTO: 1.38 K/UL (ref 1–4.8)
LYMPHOCYTES NFR BLD: 11.1 % (ref 22–41)
MAGNESIUM SERPL-MCNC: 2.1 MG/DL (ref 1.5–2.5)
MCH RBC QN AUTO: 24.9 PG (ref 27–33)
MCHC RBC AUTO-ENTMCNC: 29.5 G/DL (ref 33.6–35)
MCV RBC AUTO: 84.4 FL (ref 81.4–97.8)
MONOCYTES # BLD AUTO: 1.15 K/UL (ref 0–0.85)
MONOCYTES NFR BLD AUTO: 9.2 % (ref 0–13.4)
NEUTROPHILS # BLD AUTO: 9.53 K/UL (ref 2–7.15)
NEUTROPHILS NFR BLD: 76.7 % (ref 44–72)
NRBC # BLD AUTO: 0 K/UL
NRBC BLD-RTO: 0 /100 WBC
PHOSPHATE SERPL-MCNC: 3.4 MG/DL (ref 2.5–4.5)
PLATELET # BLD AUTO: 335 K/UL (ref 164–446)
PMV BLD AUTO: 11.2 FL (ref 9–12.9)
POTASSIUM SERPL-SCNC: 4.5 MMOL/L (ref 3.6–5.5)
PROCALCITONIN SERPL-MCNC: <0.05 NG/ML
PROT SERPL-MCNC: 6.4 G/DL (ref 6–8.2)
RBC # BLD AUTO: 4.54 M/UL (ref 4.2–5.4)
SODIUM SERPL-SCNC: 136 MMOL/L (ref 135–145)
WBC # BLD AUTO: 12.4 K/UL (ref 4.8–10.8)

## 2020-12-30 PROCEDURE — 700102 HCHG RX REV CODE 250 W/ 637 OVERRIDE(OP): Performed by: STUDENT IN AN ORGANIZED HEALTH CARE EDUCATION/TRAINING PROGRAM

## 2020-12-30 PROCEDURE — 83735 ASSAY OF MAGNESIUM: CPT

## 2020-12-30 PROCEDURE — 80053 COMPREHEN METABOLIC PANEL: CPT

## 2020-12-30 PROCEDURE — A9270 NON-COVERED ITEM OR SERVICE: HCPCS | Performed by: NURSE PRACTITIONER

## 2020-12-30 PROCEDURE — 86140 C-REACTIVE PROTEIN: CPT

## 2020-12-30 PROCEDURE — A9270 NON-COVERED ITEM OR SERVICE: HCPCS | Performed by: STUDENT IN AN ORGANIZED HEALTH CARE EDUCATION/TRAINING PROGRAM

## 2020-12-30 PROCEDURE — 84100 ASSAY OF PHOSPHORUS: CPT

## 2020-12-30 PROCEDURE — 700102 HCHG RX REV CODE 250 W/ 637 OVERRIDE(OP): Performed by: INTERNAL MEDICINE

## 2020-12-30 PROCEDURE — 84145 PROCALCITONIN (PCT): CPT

## 2020-12-30 PROCEDURE — 85025 COMPLETE CBC W/AUTO DIFF WBC: CPT

## 2020-12-30 PROCEDURE — A9270 NON-COVERED ITEM OR SERVICE: HCPCS | Performed by: INTERNAL MEDICINE

## 2020-12-30 PROCEDURE — 99232 SBSQ HOSP IP/OBS MODERATE 35: CPT | Performed by: INTERNAL MEDICINE

## 2020-12-30 PROCEDURE — 36415 COLL VENOUS BLD VENIPUNCTURE: CPT

## 2020-12-30 PROCEDURE — 700102 HCHG RX REV CODE 250 W/ 637 OVERRIDE(OP): Performed by: NURSE PRACTITIONER

## 2020-12-30 PROCEDURE — 770020 HCHG ROOM/CARE - TELE (206)

## 2020-12-30 PROCEDURE — 700111 HCHG RX REV CODE 636 W/ 250 OVERRIDE (IP): Performed by: STUDENT IN AN ORGANIZED HEALTH CARE EDUCATION/TRAINING PROGRAM

## 2020-12-30 PROCEDURE — 85379 FIBRIN DEGRADATION QUANT: CPT

## 2020-12-30 RX ADMIN — FUROSEMIDE 40 MG: 10 INJECTION, SOLUTION INTRAMUSCULAR; INTRAVENOUS at 05:04

## 2020-12-30 RX ADMIN — AMIODARONE HYDROCHLORIDE 200 MG: 200 TABLET ORAL at 05:03

## 2020-12-30 RX ADMIN — DEXAMETHASONE 6 MG: 4 TABLET ORAL at 05:04

## 2020-12-30 RX ADMIN — LORATADINE 10 MG: 10 TABLET ORAL at 05:04

## 2020-12-30 RX ADMIN — Medication 1 G: at 20:30

## 2020-12-30 RX ADMIN — Medication 1 G: at 10:14

## 2020-12-30 RX ADMIN — RIVAROXABAN 20 MG: 20 TABLET, FILM COATED ORAL at 17:24

## 2020-12-30 RX ADMIN — ATORVASTATIN CALCIUM 20 MG: 20 TABLET, FILM COATED ORAL at 17:24

## 2020-12-30 ASSESSMENT — ENCOUNTER SYMPTOMS
SHORTNESS OF BREATH: 1
FALLS: 1
WEAKNESS: 1

## 2020-12-30 NOTE — PROGRESS NOTES
Hospital Medicine Daily Progress Note    Date of Service  12/30/2020    Chief Complaint  75 y.o. female admitted 12/21/2020 with   Chief Complaint   Patient presents with   • Ankle Pain     pt felt dizzy after using restroom, stood up, GLF, unable to get up r/t R ankle pain         Hospital Course    75 y.o. female who presented 12/21/2020 with history of morbid obesity, Afib on Xarelto and amiodarone and 3 falls over the last year resulting in fractures.  She presents with pain in her right ankle after ground-level fall at home.  Just prior to arrival she was sitting on the toilet, when she stood up she felt off balance when trying to pull up her pants.  She does not believe she lost consciousness and denies any head trauma.  She remembers her son coming to her and calling EMS.    In the emergency department she is found to have a trimalleolar fracture with subsequent reduction.  Orthopedic surgery is consulted, s/p ORIF R ankle 12/23. PT rec rehab. However patient refuses and stated she has enough help at home. I spoked to her son. Per son, he needs to go to work daytime and is unable to take care of her. He is worried that patient will get fracture again. However patient is not cooperative and does not want to participate in any PT evaluation and O2 evaluation. CM following.     She was found to be hypoxic and was placed on supplemental oxygen. Procalcitonin neg. Decadron started on 12/21-12/30. Still needs 2-3L O2. Lasix 40mg iv daily started.    Interval Problem Update  Patient was seen and examined at bedside.  No acute events overnight. Patient is resting comfortably in bed and in no acute distress.     Patient continues to refuse SNF placement, the patient has capacity, is A&O x3 and understands risks of discharging home.  Anticipate discharge home with home health tomorrow.    Consultants/Specialty  orthopedics    Code Status  DNAR/DNI    Disposition  Anticipate discharge home with home health  tomorrow.    Review of Systems  Review of Systems   Constitutional: Positive for malaise/fatigue.   Respiratory: Positive for shortness of breath.    Musculoskeletal: Positive for falls and joint pain.   Neurological: Positive for weakness.        Physical Exam  Temp:  [36.2 °C (97.2 °F)-37.2 °C (98.9 °F)] 36.3 °C (97.3 °F)  Pulse:  [64-71] 71  Resp:  [16-18] 16  BP: (114-142)/() 120/71  SpO2:  [90 %-95 %] 94 %    Physical Exam  Vitals signs reviewed.   Constitutional:       General: She is not in acute distress.     Appearance: Normal appearance. She is obese. She is not toxic-appearing.   HENT:      Head: Normocephalic and atraumatic.      Right Ear: External ear normal.      Left Ear: External ear normal.      Nose: Nose normal. No congestion.      Mouth/Throat:      Pharynx: Oropharynx is clear. No oropharyngeal exudate.   Eyes:      Extraocular Movements: Extraocular movements intact.      Conjunctiva/sclera: Conjunctivae normal.      Pupils: Pupils are equal, round, and reactive to light.   Cardiovascular:      Rate and Rhythm: Normal rate and regular rhythm.      Pulses: Normal pulses.      Heart sounds: Normal heart sounds.   Pulmonary:      Effort: Pulmonary effort is normal.      Breath sounds: Normal breath sounds. No wheezing or rales.      Comments: Diminished breath sounds in lower lung base bilaterally  Abdominal:      General: Bowel sounds are normal.      Palpations: Abdomen is soft.      Tenderness: There is no abdominal tenderness. There is no guarding or rebound.   Musculoskeletal: Normal range of motion.         General: Tenderness: right ankle tender to palpation, Tender with PROM.      Comments: Right lower extremity dressing dry, no drainage.    Skin:     General: Skin is warm and dry.      Coloration: Skin is not jaundiced.   Neurological:      General: No focal deficit present.      Mental Status: She is alert and oriented to person, place, and time. Mental status is at baseline.    Psychiatric:         Mood and Affect: Mood normal.         Behavior: Behavior normal.         Fluids    Intake/Output Summary (Last 24 hours) at 12/30/2020 1432  Last data filed at 12/30/2020 0724  Gross per 24 hour   Intake --   Output 1200 ml   Net -1200 ml       Laboratory  Recent Labs     12/29/20  0518 12/30/20  0723   WBC 9.5 12.4*   RBC 4.06* 4.54   HEMOGLOBIN 10.3* 11.3*   HEMATOCRIT 34.5* 38.3   MCV 85.0 84.4   MCH 25.4* 24.9*   MCHC 29.9* 29.5*   RDW 53.5* 53.1*   PLATELETCT 362 335   MPV 10.1 11.2     Recent Labs     12/29/20  0518 12/30/20  0723   SODIUM 133* 136   POTASSIUM 4.6 4.5   CHLORIDE 100 98   CO2 27 28   GLUCOSE 83 99   BUN 32* 34*   CREATININE 0.79 0.80   CALCIUM 9.2 9.4                   Imaging  DX-CHEST-PORTABLE (1 VIEW)   Final Result      1.  Worsening hypoinflation with increasing multifocal peripheral infiltrates, compatible with covid 19 pneumonia.   2.  Stable cardiomegaly.      DX-PORTABLE FLUOROSCOPY < 1 HOUR   Final Result      Intraoperative fluoroscopic spot images as described above.      DX-ANKLE 2- VIEWS RIGHT   Final Result      Intraoperative fluoroscopic spot images as described above.      DX-ANKLE 2- VIEWS RIGHT   Final Result      Improved alignment of ankle subluxation and trimalleolar fracture status post closed reduction and splinting.            DX-CHEST-PORTABLE (1 VIEW)   Final Result      Cardiac silhouette enlargement.            DX-ANKLE 2- VIEWS RIGHT   Final Result      Mildly improved alignment of ankle subluxation and trimalleolar fracture status post closed reduction.         DX-ANKLE 3+ VIEWS RIGHT   Final Result      Trimalleolar fracture with ankle subluxation.              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    Ortho consulted: s/p ORIF R ankle 12/23  Pain managements  Weight Bearing Status-TTWB x 6 weeks  PT/OT: post acute.  Patient refuses SNF and is  aware of consequences of discharging home including serious injury, death. Patient is A&O x3 and able to make own decisions.   Will discharge to home with home health when stable.       Acute hypoxemic respiratory failure due to COVID-19 ([facility])  Assessment & Plan  Continue oxygen supplements, wean as tolerated  Procalcitonin negative  Start the Decadron 12/21-12/30  Supportive managements: RT protocol, IS, judicious fluid, self prone  Start lasix 40mg iv daily, I/O, CXR follow up (ordered on 12/28)  Currently 2L nasal cannula    COVID-19  Assessment & Plan  Procalcitonin normal, No indication for antibiotics  Trend inflammatory markers    A-fib ([facility])  Assessment & Plan  Rate controlled  Cont amiodarone,  Xarelto     Hyponatremia  Assessment & Plan  Resolved, asymptomatic  On salt tablets  continue monitoring    Leukocytosis  Assessment & Plan  Procalcitonin normal, likely secondary to steroid  Continue monitoring    Falls frequently  Assessment & Plan  Associated with severe physical deconditioning.  Follow-up PT eval, however patient is not cooperative    Morbid obesity ([facility])  Assessment & Plan  BMI 47  diet and lifestyle modification recommended    Chronic anticoagulation  Assessment & Plan  On hold prior surgery.  Discussed with ortho scott Egan to resume it on 12/24       VTE prophylaxis: xarelto

## 2020-12-30 NOTE — DISCHARGE PLANNING
@1600  Agency/Facility Name: [facility]  Spoke To: Alan  Outcome: Informed patient requires wheelchair.    @1520  Agency/Facility Name: [facility]  Spoke To: Dali  Outcome: Needs O2 sats and insurance will only cover either a wheelchair or a walker, not both.    Received Choice form at 1500  Agency/Facility Name: [facility]  Referral sent per Choice form @ 1500     @1500  Agency/Facility Name: [facility]  Spoke To: Lino  Outcome: Declined due to noncompliance.    Received Choice form at 1420  Agency/Facility Name: [facility]  Referral sent per Choice form @ 1420     Received Choice form at 1420  Agency/Facility Name: [facility]  Referral sent per Choice form @ 1420

## 2020-12-30 NOTE — DISCHARGE PLANNING
Anticipated Discharge Disposition: Home with HH and O@    Action: LSW informed that Pt has been refusing SNF and insists on going home with HH instead. Per Dr. Chawla, ,Pt is alert and oriented so she is capable of refusing. LSW spoke with Pt. She is agreeable to DME and HH referrals and has no preference to who it is sent to.     Choice form faxed to [facility].     Barriers to Discharge: HH and DME acceptance    Plan: LSW to follow up on referrals.

## 2020-12-30 NOTE — PROGRESS NOTES
Report received at bedside from day RN, pt care assumed. Pt aaox4, no signs of distress noted at this time. Patient resting comfortably in bed. POC discussed with pt and verbalizes no questions. Pt c/o of no pain. Pt denies any additional needs at this time. Bed in lowest position, pt educated on fall risk and verbalized understanding, call light within reach, bed alarm plugged in and on.

## 2020-12-30 NOTE — THERAPY
"Missed Therapy     Patient Name: Linda Goodwin  Age:  75 y.o., Sex:  female  Medical Record #: [id number]  Today's Date: 12/30/2020 12/30/20 1500   Treatment Variance   Reason For Missed Therapy Non-Medical - Patient Refused   Interdisciplinary Plan of Care Collaboration   IDT Collaboration with  Nursing;Occupational Therapist   Patient Position at End of Therapy In Bed   Collaboration Comments PT session attempted for the third time. Patient was not willing to participate and was not willing to listen to the explanation of why OOB mobility is important while in house. Patient states, \" It doesnt matter, I am going home today and that is that. PT informed patient of the three strikes rule, and that this will be the last time PT asks her to participate. Patient verbalized unsderstanding.     "

## 2020-12-30 NOTE — DISCHARGE PLANNING
Anticipated Discharge Disposition: Home with HH and DME    Action: LSW attempted to call Pt on her room phone, no answer. LSW to attempt calling her again later.     Barriers to Discharge: Pt consent to DME and HH referrals.     Plan: LSW to continue trying to reach Pt for DME and HH choice.

## 2020-12-31 VITALS
DIASTOLIC BLOOD PRESSURE: 65 MMHG | RESPIRATION RATE: 18 BRPM | HEART RATE: 73 BPM | WEIGHT: 241.18 LBS | HEIGHT: 60 IN | TEMPERATURE: 97.5 F | SYSTOLIC BLOOD PRESSURE: 131 MMHG | BODY MASS INDEX: 47.35 KG/M2 | OXYGEN SATURATION: 93 %

## 2020-12-31 PROCEDURE — 700102 HCHG RX REV CODE 250 W/ 637 OVERRIDE(OP): Performed by: STUDENT IN AN ORGANIZED HEALTH CARE EDUCATION/TRAINING PROGRAM

## 2020-12-31 PROCEDURE — 700102 HCHG RX REV CODE 250 W/ 637 OVERRIDE(OP): Performed by: INTERNAL MEDICINE

## 2020-12-31 PROCEDURE — 99239 HOSP IP/OBS DSCHRG MGMT >30: CPT | Performed by: INTERNAL MEDICINE

## 2020-12-31 PROCEDURE — A9270 NON-COVERED ITEM OR SERVICE: HCPCS | Performed by: STUDENT IN AN ORGANIZED HEALTH CARE EDUCATION/TRAINING PROGRAM

## 2020-12-31 PROCEDURE — 700111 HCHG RX REV CODE 636 W/ 250 OVERRIDE (IP): Performed by: STUDENT IN AN ORGANIZED HEALTH CARE EDUCATION/TRAINING PROGRAM

## 2020-12-31 PROCEDURE — A9270 NON-COVERED ITEM OR SERVICE: HCPCS | Performed by: INTERNAL MEDICINE

## 2020-12-31 RX ADMIN — FUROSEMIDE 40 MG: 10 INJECTION, SOLUTION INTRAMUSCULAR; INTRAVENOUS at 05:40

## 2020-12-31 RX ADMIN — LORATADINE 10 MG: 10 TABLET ORAL at 05:40

## 2020-12-31 RX ADMIN — Medication 1 G: at 08:17

## 2020-12-31 RX ADMIN — AMIODARONE HYDROCHLORIDE 200 MG: 200 TABLET ORAL at 05:40

## 2020-12-31 NOTE — DISCHARGE PLANNING
Met with pt at bedside. Pt adamantly refusing to ambulate/pivot to W/C with pulse oximeter in place. Pt states she wants to go home. Pt educated repeatedly on the dangers of leaving without proper DME oxygen and W/C. Pt verbalized understanding. Pt states her son is her ride and he will come get her. Updated bedside RN.

## 2020-12-31 NOTE — PROGRESS NOTES
Assumed patient care. Patient not in distress. Patient A&Ox4 on 2L O2. Tele box on. Patient refusing to participate in PT or walking pulse ox activities. Safety precautions in place. Call light and personal belongings within reach. Educated to call for assistance.

## 2020-12-31 NOTE — PROGRESS NOTES
Report given by night shift that the patient refuses PT. At the beginning of the shift I educated the patient and tried to get her to understand the importance of participating in physical therapy activities.She flat out refused and was adamant that she will not do PT here and just wants to go home. She stated she uses a lift chair at home and since the hospital doesn't have that equipment she can not get up. She would not agree to try with PT. She stated she would let the therapists know what she needs once they arrive to her home. She also refused to do a walking pulse ox. She now wants to leave AMA.

## 2020-12-31 NOTE — DISCHARGE PLANNING
@1109  Agency/Facility Name: [facility]  Spoke To: Lucinda  Outcome: Accepted.    @0945  Agency/Facility Name: [facility]  Outcome: Left message, awaiting call back.    Received Choice form at 0750  Agency/[facility] Name: [facility]  Referral sent per Choice form @ 0750    Agency[facility]ility Name: [facility]  Spoke To: Admission  Outcome: Declined due to noncompliance

## 2020-12-31 NOTE — PROGRESS NOTES
Patient's son's phone is going straight to voicemail. She has not been in contact with him since the initial phone call telling him to come pick her up. She wanted to go downstairs to the main entrance as she thought he'd be there waiting. I informed her that I would not be able to stay and wait in the event he was not there. She wanted to go anyway so I escorted her down in a wheelchair. Her son was not there. I called and left the son a voicemail saying she'd be at the main entrance waiting for him. I left her in the wheelchair at the entrance and informed the  of the situation.

## 2020-12-31 NOTE — PROGRESS NOTES
Patient seen and examined  Discharge planning issues  Will not participate in PT    /64   Pulse 63   Temp 36.2 °C (97.2 °F) (Temporal)   Resp 18   Ht 1.524 m (5')   Wt 109.4 kg (241 lb 2.9 oz)   SpO2 92%     Recent Labs     12/29/20  0518 12/30/20  0723   WBC 9.5 12.4*   RBC 4.06* 4.54   HEMOGLOBIN 10.3* 11.3*   HEMATOCRIT 34.5* 38.3   MCV 85.0 84.4   MCH 25.4* 24.9*   MCHC 29.9* 29.5*   RDW 53.5* 53.1*   PLATELETCT 362 335   MPV 10.1 11.2       No acute distress  Dressing clean dry and intact  Neurovascularly intact    POD# 8  S/P ORIF ankle    Plan:  DVT Prophylaxis- TEDS/SCDs, Xarelto while in hospital  Weight Bearing Status- TTWB RLE  PT/OT  Antibiotics: None  Case Coordination

## 2020-12-31 NOTE — PROGRESS NOTES
Received bedside report from RN, pt care assumed, VSS, pt assessment complete. Pt AAOx4, no c/o pain at this time. Patient on 2L via oxymask, SpO2 at 92%. No signs of acute distress noted at this time. POC discussed with pt and verbalizes no questions. Pt denies any additional needs at this time. Bed in lowest position, pt educated on fall risk and verbalized understanding, call light within reach, hourly rounding initiated.

## 2020-12-31 NOTE — DISCHARGE SUMMARY
Discharge Summary    CHIEF COMPLAINT ON ADMISSION  Chief Complaint   Patient presents with   • Ankle Pain     pt felt dizzy after using restroom, stood up, GLF, unable to get up r/t R ankle pain       Reason for Admission  TBI     Admission Date  12/21/2020    CODE STATUS  DNAR/DNI    HPI & HOSPITAL COURSE  75 y.o. female who presented 12/21/2020 with history of morbid obesity, Afib on Xarelto and amiodarone and 3 falls over the last year resulting in fractures. She presents with pain in her right ankle after ground-level fall at home. Orthopedic surgery consulted, s/p ORIF right ankle 12/23/2020. PT recommending SNF. Patient refused SNF and is of capacity and therefore care management working to discharge home with home health in addition to appropriate DME and oxygen. Patient COVID positive requiring 2L supplemental oxygen via oxymask. Patient made decision to leave AMA.     Therefore, she is discharged in guarded and stable condition against medcial advice.    The patient met 2-midnight criteria for an inpatient stay at the time of discharge.    Discharge Date  12/31/2020    FOLLOW UP ITEMS POST DISCHARGE  Please follow up with PCP in 3-5 days for post hospitalization follow up and medication reconciliation.     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  Pcp Pt States None          Thor Robles M.D.  [address] [place]  [id number]  [phone]    In 2 weeks  ORIF 12/23      MEDICATIONS ON DISCHARGE     Medication List      ASK your doctor about these medications      Instructions   amiodarone 200 MG Tabs  Commonly known as: Cordarone   Take 200 mg by mouth every morning.  Dose: 200 mg     atorvastatin 20 MG Tabs  Commonly known as: LIPITOR   Take 20 mg by mouth every evening.  Dose: 20 mg     hydroCHLOROthiazide 25 MG Tabs  Commonly known as: HYDRODIURIL   Take 25 mg by mouth every morning.  Dose: 25 mg     rivaroxaban 20 MG Tabs tablet  Commonly known as: XARELTO   Take 20 mg by mouth with dinner.  Dose: 20 mg            Allergies  No Known Allergies    DIET  Orders Placed This Encounter   Procedures   • Diet Order Diet: Regular     Standing Status:   Standing     Number of Occurrences:   1     Order Specific Question:   Diet:     Answer:   Regular [1]       ACTIVITY  As tolerated.  Toe touch RIGHT leg    CONSULTATIONS  Orthopedic surgery    PROCEDURES  ORIF right ankle    LABORATORY  Lab Results   Component Value Date    SODIUM 136 12/30/2020    POTASSIUM 4.5 12/30/2020    CHLORIDE 98 12/30/2020    CO2 28 12/30/2020    GLUCOSE 99 12/30/2020    BUN 34 (H) 12/30/2020    CREATININE 0.80 12/30/2020        Lab Results   Component Value Date    WBC 12.4 (H) 12/30/2020    HEMOGLOBIN 11.3 (L) 12/30/2020    HEMATOCRIT 38.3 12/30/2020    PLATELETCT 335 12/30/2020        Total time of the discharge process exceeds 33 minutes.

## 2020-12-31 NOTE — PROGRESS NOTES
Linda Goodwin patient has chosen to leave the hospital against medical advice. The attending physician has not discharged the patient. Patient is not a risk to himself or others. I have discussed with the patient the following: Physician has not determined patient is medically appropriate for discharge.            Patient is a greater than 60 years old  and a referral to  was made.    Attending physician has been notified.

## 2021-01-01 NOTE — PROGRESS NOTES
Linda Goodwin patient has chosen to leave the hospital against medical advice. The attending physician has not discharged the patient. Patient is not a risk to himself or others. I have discussed with the patient the following:  Physician has not determined patient is ready for discharge.        Patient is a greater than 60 years old  and a referral to  was made.    Attending physician notified at 1632.

## 2021-01-14 DIAGNOSIS — Z23 NEED FOR VACCINATION: ICD-10-CM

## 2021-07-27 ENCOUNTER — HOSPITAL ENCOUNTER (INPATIENT)
Dept: HOSPITAL 8 - ED | Age: 76
LOS: 8 days | Discharge: HOME | DRG: 559 | End: 2021-08-04
Attending: HOSPITALIST | Admitting: INTERNAL MEDICINE
Payer: MEDICARE

## 2021-07-27 VITALS — HEIGHT: 60 IN | WEIGHT: 258.16 LBS | BODY MASS INDEX: 50.68 KG/M2

## 2021-07-27 DIAGNOSIS — E66.9: ICD-10-CM

## 2021-07-27 DIAGNOSIS — M10.9: ICD-10-CM

## 2021-07-27 DIAGNOSIS — M00.9: ICD-10-CM

## 2021-07-27 DIAGNOSIS — A41.9: ICD-10-CM

## 2021-07-27 DIAGNOSIS — Z79.01: ICD-10-CM

## 2021-07-27 DIAGNOSIS — T84.624A: Primary | ICD-10-CM

## 2021-07-27 DIAGNOSIS — Z82.49: ICD-10-CM

## 2021-07-27 DIAGNOSIS — E78.5: ICD-10-CM

## 2021-07-27 DIAGNOSIS — B95.61: ICD-10-CM

## 2021-07-27 DIAGNOSIS — E78.00: ICD-10-CM

## 2021-07-27 DIAGNOSIS — I48.20: ICD-10-CM

## 2021-07-27 DIAGNOSIS — E87.6: ICD-10-CM

## 2021-07-27 DIAGNOSIS — I10: ICD-10-CM

## 2021-07-27 DIAGNOSIS — Z90.710: ICD-10-CM

## 2021-07-27 DIAGNOSIS — L03.115: ICD-10-CM
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
## 2021-07-27 DIAGNOSIS — D50.9: ICD-10-CM

## 2021-07-27 DIAGNOSIS — F17.200: ICD-10-CM

## 2021-07-27 DIAGNOSIS — M85.80: ICD-10-CM

## 2021-07-27 DIAGNOSIS — Z87.81: ICD-10-CM
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
## 2021-07-27 DIAGNOSIS — M70.41: ICD-10-CM

## 2021-07-27 DIAGNOSIS — M19.90: ICD-10-CM

## 2021-07-27 PROCEDURE — 76000 FLUOROSCOPY <1 HR PHYS/QHP: CPT

## 2021-07-27 PROCEDURE — 87040 BLOOD CULTURE FOR BACTERIA: CPT

## 2021-07-27 PROCEDURE — 87077 CULTURE AEROBIC IDENTIFY: CPT

## 2021-07-27 PROCEDURE — 36415 COLL VENOUS BLD VENIPUNCTURE: CPT

## 2021-07-27 PROCEDURE — 96375 TX/PRO/DX INJ NEW DRUG ADDON: CPT

## 2021-07-27 PROCEDURE — 83540 ASSAY OF IRON: CPT

## 2021-07-27 PROCEDURE — 87070 CULTURE OTHR SPECIMN AEROBIC: CPT

## 2021-07-27 PROCEDURE — 83550 IRON BINDING TEST: CPT

## 2021-07-27 PROCEDURE — 93005 ELECTROCARDIOGRAM TRACING: CPT

## 2021-07-27 PROCEDURE — 86140 C-REACTIVE PROTEIN: CPT

## 2021-07-27 PROCEDURE — 87635 SARS-COV-2 COVID-19 AMP PRB: CPT

## 2021-07-27 PROCEDURE — 87205 SMEAR GRAM STAIN: CPT

## 2021-07-27 PROCEDURE — 82728 ASSAY OF FERRITIN: CPT

## 2021-07-27 PROCEDURE — A9575 INJ GADOTERATE MEGLUMI 0.1ML: HCPCS

## 2021-07-27 PROCEDURE — 80053 COMPREHEN METABOLIC PANEL: CPT

## 2021-07-27 PROCEDURE — 87324 CLOSTRIDIUM AG IA: CPT

## 2021-07-27 PROCEDURE — 80202 ASSAY OF VANCOMYCIN: CPT

## 2021-07-27 PROCEDURE — 85651 RBC SED RATE NONAUTOMATED: CPT

## 2021-07-27 PROCEDURE — C1751 CATH, INF, PER/CENT/MIDLINE: HCPCS

## 2021-07-27 PROCEDURE — 87186 SC STD MICRODIL/AGAR DIL: CPT

## 2021-07-27 PROCEDURE — 85025 COMPLETE CBC W/AUTO DIFF WBC: CPT

## 2021-07-27 PROCEDURE — 87075 CULTR BACTERIA EXCEPT BLOOD: CPT

## 2021-07-27 PROCEDURE — 36573 INSJ PICC RS&I 5 YR+: CPT

## 2021-07-27 PROCEDURE — 80048 BASIC METABOLIC PNL TOTAL CA: CPT

## 2021-07-27 PROCEDURE — 96374 THER/PROPH/DIAG INJ IV PUSH: CPT

## 2021-07-27 NOTE — NUR
[facility] CALLED REGARDING PATIENT NEEDING TO USE RESTROOM. SUPPORT STAFF ANGELA SENT 
TO GET PATIENT TO RESTROOM.

## 2021-07-28 VITALS — SYSTOLIC BLOOD PRESSURE: 136 MMHG | DIASTOLIC BLOOD PRESSURE: 43 MMHG

## 2021-07-28 VITALS — SYSTOLIC BLOOD PRESSURE: 118 MMHG | DIASTOLIC BLOOD PRESSURE: 64 MMHG

## 2021-07-28 VITALS — DIASTOLIC BLOOD PRESSURE: 68 MMHG | SYSTOLIC BLOOD PRESSURE: 119 MMHG

## 2021-07-28 VITALS — SYSTOLIC BLOOD PRESSURE: 104 MMHG | DIASTOLIC BLOOD PRESSURE: 63 MMHG

## 2021-07-28 LAB
% IRON SATURATION: 11 % (ref 20–55)
ALBUMIN SERPL-MCNC: 2.5 G/DL (ref 3.4–5)
ALP SERPL-CCNC: 94 U/L (ref 45–117)
ALT SERPL-CCNC: 20 U/L (ref 12–78)
ANION GAP SERPL CALC-SCNC: 3 MMOL/L (ref 5–15)
ANION GAP SERPL CALC-SCNC: 6 MMOL/L (ref 5–15)
BASOPHILS # BLD AUTO: 0.1 X10^3/UL (ref 0–0.1)
BASOPHILS # BLD AUTO: 0.1 X10^3/UL (ref 0–0.1)
BASOPHILS NFR BLD AUTO: 0 % (ref 0–1)
BASOPHILS NFR BLD AUTO: 1 % (ref 0–1)
BILIRUB SERPL-MCNC: 0.3 MG/DL (ref 0.2–1)
CALCIUM SERPL-MCNC: 9.4 MG/DL (ref 8.5–10.1)
CALCIUM SERPL-MCNC: 9.7 MG/DL (ref 8.5–10.1)
CHLORIDE SERPL-SCNC: 105 MMOL/L (ref 98–107)
CHLORIDE SERPL-SCNC: 108 MMOL/L (ref 98–107)
CREAT SERPL-MCNC: 0.76 MG/DL (ref 0.55–1.02)
CREAT SERPL-MCNC: 0.88 MG/DL (ref 0.55–1.02)
EOSINOPHIL # BLD AUTO: 0.1 X10^3/UL (ref 0–0.4)
EOSINOPHIL # BLD AUTO: 0.2 X10^3/UL (ref 0–0.4)
EOSINOPHIL NFR BLD AUTO: 1 % (ref 1–7)
EOSINOPHIL NFR BLD AUTO: 1 % (ref 1–7)
ERYTHROCYTE [DISTWIDTH] IN BLOOD BY AUTOMATED COUNT: 19.9 % (ref 9.6–15.2)
ERYTHROCYTE [DISTWIDTH] IN BLOOD BY AUTOMATED COUNT: 19.9 % (ref 9.6–15.2)
IRON LEVEL: 23 MCG/DL (ref 50–170)
LYMPHOCYTES # BLD AUTO: 1.4 X10^3/UL (ref 1–3.4)
LYMPHOCYTES # BLD AUTO: 2 X10^3/UL (ref 1–3.4)
LYMPHOCYTES NFR BLD AUTO: 12 % (ref 22–44)
LYMPHOCYTES NFR BLD AUTO: 14 % (ref 22–44)
MCH RBC QN AUTO: 25.4 PG (ref 27–34.8)
MCH RBC QN AUTO: 25.6 PG (ref 27–34.8)
MCHC RBC AUTO-ENTMCNC: 31.8 G/DL (ref 32.4–35.8)
MCHC RBC AUTO-ENTMCNC: 32.3 G/DL (ref 32.4–35.8)
MONOCYTES # BLD AUTO: 1.2 X10^3/UL (ref 0.2–0.8)
MONOCYTES # BLD AUTO: 1.4 X10^3/UL (ref 0.2–0.8)
MONOCYTES NFR BLD AUTO: 10 % (ref 2–9)
MONOCYTES NFR BLD AUTO: 10 % (ref 2–9)
NEUTROPHILS # BLD AUTO: 10.4 X10^3/UL (ref 1.8–6.8)
NEUTROPHILS # BLD AUTO: 9.1 X10^3/UL (ref 1.8–6.8)
NEUTROPHILS NFR BLD AUTO: 74 % (ref 42–75)
NEUTROPHILS NFR BLD AUTO: 76 % (ref 42–75)
PLATELET # BLD AUTO: 369 X10^3/UL (ref 130–400)
PLATELET # BLD AUTO: 397 X10^3/UL (ref 130–400)
PMV BLD AUTO: 7.4 FL (ref 7.4–10.4)
PMV BLD AUTO: 7.6 FL (ref 7.4–10.4)
PROT SERPL-MCNC: 7.7 G/DL (ref 6.4–8.2)
RBC # BLD AUTO: 4.08 X10^6/UL (ref 3.82–5.3)
RBC # BLD AUTO: 4.55 X10^6/UL (ref 3.82–5.3)
TIBC SERPL-MCNC: 216 MCG/DL (ref 250–450)

## 2021-07-28 RX ADMIN — MORPHINE SULFATE PRN MG: 10 INJECTION INTRAVENOUS at 06:32

## 2021-07-28 RX ADMIN — AMPICILLIN SODIUM AND SULBACTAM SODIUM SCH MLS/HR: 2; 1 INJECTION, POWDER, FOR SOLUTION INTRAMUSCULAR; INTRAVENOUS at 17:27

## 2021-07-28 RX ADMIN — MORPHINE SULFATE PRN MG: 10 INJECTION INTRAVENOUS at 17:40

## 2021-07-28 RX ADMIN — AMIODARONE HYDROCHLORIDE SCH MG: 200 TABLET ORAL at 20:14

## 2021-07-28 RX ADMIN — RIVAROXABAN SCH MG: 20 TABLET, FILM COATED ORAL at 17:27

## 2021-07-28 RX ADMIN — MORPHINE SULFATE PRN MG: 10 INJECTION INTRAVENOUS at 23:07

## 2021-07-28 RX ADMIN — AMPICILLIN SODIUM AND SULBACTAM SODIUM SCH MLS/HR: 2; 1 INJECTION, POWDER, FOR SOLUTION INTRAMUSCULAR; INTRAVENOUS at 11:40

## 2021-07-28 RX ADMIN — ATORVASTATIN CALCIUM SCH MG: 20 TABLET, FILM COATED ORAL at 20:15

## 2021-07-28 RX ADMIN — VANCOMYCIN HYDROCHLORIDE ONE MLS/HR: 1 INJECTION, POWDER, LYOPHILIZED, FOR SOLUTION INTRAVENOUS at 20:14

## 2021-07-28 RX ADMIN — AMIODARONE HYDROCHLORIDE SCH MG: 200 TABLET ORAL at 09:52

## 2021-07-28 RX ADMIN — VANCOMYCIN HYDROCHLORIDE ONE MLS/HR: 1 INJECTION, POWDER, LYOPHILIZED, FOR SOLUTION INTRAVENOUS at 22:45

## 2021-07-28 RX ADMIN — FERROUS SULFATE TAB 325 MG (65 MG ELEMENTAL FE) SCH MG: 325 (65 FE) TAB at 09:52

## 2021-07-29 VITALS — SYSTOLIC BLOOD PRESSURE: 152 MMHG | DIASTOLIC BLOOD PRESSURE: 74 MMHG

## 2021-07-29 VITALS — DIASTOLIC BLOOD PRESSURE: 72 MMHG | SYSTOLIC BLOOD PRESSURE: 149 MMHG

## 2021-07-29 VITALS — DIASTOLIC BLOOD PRESSURE: 69 MMHG | SYSTOLIC BLOOD PRESSURE: 131 MMHG

## 2021-07-29 VITALS — DIASTOLIC BLOOD PRESSURE: 46 MMHG | SYSTOLIC BLOOD PRESSURE: 98 MMHG

## 2021-07-29 LAB
ANION GAP SERPL CALC-SCNC: 6 MMOL/L (ref 5–15)
BASOPHILS # BLD AUTO: 0.1 X10^3/UL (ref 0–0.1)
BASOPHILS NFR BLD AUTO: 1 % (ref 0–1)
CALCIUM SERPL-MCNC: 9 MG/DL (ref 8.5–10.1)
CHLORIDE SERPL-SCNC: 109 MMOL/L (ref 98–107)
CREAT SERPL-MCNC: 0.98 MG/DL (ref 0.55–1.02)
EOSINOPHIL # BLD AUTO: 0.3 X10^3/UL (ref 0–0.4)
EOSINOPHIL NFR BLD AUTO: 2 % (ref 1–7)
ERYTHROCYTE [DISTWIDTH] IN BLOOD BY AUTOMATED COUNT: 19.9 % (ref 9.6–15.2)
LYMPHOCYTES # BLD AUTO: 1.9 X10^3/UL (ref 1–3.4)
LYMPHOCYTES NFR BLD AUTO: 17 % (ref 22–44)
MCH RBC QN AUTO: 25.6 PG (ref 27–34.8)
MCHC RBC AUTO-ENTMCNC: 32 G/DL (ref 32.4–35.8)
MONOCYTES # BLD AUTO: 1.2 X10^3/UL (ref 0.2–0.8)
MONOCYTES NFR BLD AUTO: 11 % (ref 2–9)
NEUTROPHILS # BLD AUTO: 8.1 X10^3/UL (ref 1.8–6.8)
NEUTROPHILS NFR BLD AUTO: 70 % (ref 42–75)
PLATELET # BLD AUTO: 359 X10^3/UL (ref 130–400)
PMV BLD AUTO: 7.7 FL (ref 7.4–10.4)
RBC # BLD AUTO: 3.86 X10^6/UL (ref 3.82–5.3)

## 2021-07-29 RX ADMIN — RIVAROXABAN SCH MG: 20 TABLET, FILM COATED ORAL at 16:41

## 2021-07-29 RX ADMIN — AMIODARONE HYDROCHLORIDE SCH MG: 200 TABLET ORAL at 08:02

## 2021-07-29 RX ADMIN — VANCOMYCIN HYDROCHLORIDE SCH MLS/HR: 1 INJECTION, POWDER, LYOPHILIZED, FOR SOLUTION INTRAVENOUS at 14:07

## 2021-07-29 RX ADMIN — ACETAMINOPHEN PRN MG: 325 TABLET, FILM COATED ORAL at 00:40

## 2021-07-29 RX ADMIN — MORPHINE SULFATE PRN MG: 10 INJECTION INTRAVENOUS at 21:23

## 2021-07-29 RX ADMIN — MORPHINE SULFATE PRN MG: 10 INJECTION INTRAVENOUS at 10:06

## 2021-07-29 RX ADMIN — AMPICILLIN SODIUM AND SULBACTAM SODIUM SCH MLS/HR: 2; 1 INJECTION, POWDER, FOR SOLUTION INTRAMUSCULAR; INTRAVENOUS at 06:42

## 2021-07-29 RX ADMIN — MORPHINE SULFATE PRN MG: 10 INJECTION INTRAVENOUS at 16:41

## 2021-07-29 RX ADMIN — AMPICILLIN SODIUM AND SULBACTAM SODIUM SCH MLS/HR: 2; 1 INJECTION, POWDER, FOR SOLUTION INTRAMUSCULAR; INTRAVENOUS at 21:22

## 2021-07-29 RX ADMIN — AMPICILLIN SODIUM AND SULBACTAM SODIUM SCH MLS/HR: 2; 1 INJECTION, POWDER, FOR SOLUTION INTRAMUSCULAR; INTRAVENOUS at 00:40

## 2021-07-29 RX ADMIN — AMPICILLIN SODIUM AND SULBACTAM SODIUM SCH MLS/HR: 2; 1 INJECTION, POWDER, FOR SOLUTION INTRAMUSCULAR; INTRAVENOUS at 16:15

## 2021-07-29 RX ADMIN — ATORVASTATIN CALCIUM SCH MG: 20 TABLET, FILM COATED ORAL at 21:22

## 2021-07-29 RX ADMIN — AMIODARONE HYDROCHLORIDE SCH MG: 200 TABLET ORAL at 21:23

## 2021-07-29 RX ADMIN — FERROUS SULFATE TAB 325 MG (65 MG ELEMENTAL FE) SCH MG: 325 (65 FE) TAB at 08:01

## 2021-07-30 VITALS — DIASTOLIC BLOOD PRESSURE: 79 MMHG | SYSTOLIC BLOOD PRESSURE: 144 MMHG

## 2021-07-30 VITALS — SYSTOLIC BLOOD PRESSURE: 137 MMHG | DIASTOLIC BLOOD PRESSURE: 78 MMHG

## 2021-07-30 VITALS — SYSTOLIC BLOOD PRESSURE: 143 MMHG | DIASTOLIC BLOOD PRESSURE: 81 MMHG

## 2021-07-30 VITALS — SYSTOLIC BLOOD PRESSURE: 107 MMHG | DIASTOLIC BLOOD PRESSURE: 56 MMHG

## 2021-07-30 VITALS — SYSTOLIC BLOOD PRESSURE: 130 MMHG | DIASTOLIC BLOOD PRESSURE: 72 MMHG

## 2021-07-30 PROCEDURE — 0M9N3ZZ DRAINAGE OF RIGHT KNEE BURSA AND LIGAMENT, PERCUTANEOUS APPROACH: ICD-10-PCS | Performed by: ORTHOPAEDIC SURGERY

## 2021-07-30 PROCEDURE — 0YP90YZ REMOVAL OF OTHER DEVICE FROM RIGHT LOWER EXTREMITY, OPEN APPROACH: ICD-10-PCS | Performed by: ORTHOPAEDIC SURGERY

## 2021-07-30 RX ADMIN — VANCOMYCIN HYDROCHLORIDE SCH MLS/HR: 1 INJECTION, POWDER, LYOPHILIZED, FOR SOLUTION INTRAVENOUS at 05:12

## 2021-07-30 RX ADMIN — AMPICILLIN SODIUM AND SULBACTAM SODIUM SCH MLS/HR: 2; 1 INJECTION, POWDER, FOR SOLUTION INTRAMUSCULAR; INTRAVENOUS at 09:35

## 2021-07-30 RX ADMIN — ATORVASTATIN CALCIUM SCH MG: 20 TABLET, FILM COATED ORAL at 20:47

## 2021-07-30 RX ADMIN — FERROUS SULFATE TAB 325 MG (65 MG ELEMENTAL FE) SCH MG: 325 (65 FE) TAB at 08:00

## 2021-07-30 RX ADMIN — MORPHINE SULFATE PRN MG: 10 INJECTION INTRAVENOUS at 05:24

## 2021-07-30 RX ADMIN — MORPHINE SULFATE PRN MG: 10 INJECTION INTRAVENOUS at 01:55

## 2021-07-30 RX ADMIN — AMIODARONE HYDROCHLORIDE SCH MG: 200 TABLET ORAL at 09:34

## 2021-07-30 RX ADMIN — KETOROLAC TROMETHAMINE PRN MG: 30 INJECTION, SOLUTION INTRAMUSCULAR at 03:23

## 2021-07-30 RX ADMIN — AMPICILLIN SODIUM AND SULBACTAM SODIUM SCH MLS/HR: 2; 1 INJECTION, POWDER, FOR SOLUTION INTRAMUSCULAR; INTRAVENOUS at 03:24

## 2021-07-30 RX ADMIN — AMIODARONE HYDROCHLORIDE SCH MG: 200 TABLET ORAL at 20:47

## 2021-07-30 RX ADMIN — KETOROLAC TROMETHAMINE PRN MG: 30 INJECTION, SOLUTION INTRAMUSCULAR at 20:47

## 2021-07-30 RX ADMIN — AMPICILLIN SODIUM AND SULBACTAM SODIUM SCH MLS/HR: 2; 1 INJECTION, POWDER, FOR SOLUTION INTRAMUSCULAR; INTRAVENOUS at 18:13

## 2021-07-30 RX ADMIN — MORPHINE SULFATE PRN MG: 10 INJECTION INTRAVENOUS at 17:30

## 2021-07-30 RX ADMIN — KETOROLAC TROMETHAMINE PRN MG: 30 INJECTION, SOLUTION INTRAMUSCULAR at 09:33

## 2021-07-30 RX ADMIN — RIVAROXABAN SCH MG: 20 TABLET, FILM COATED ORAL at 17:30

## 2021-07-31 VITALS — DIASTOLIC BLOOD PRESSURE: 75 MMHG | SYSTOLIC BLOOD PRESSURE: 130 MMHG

## 2021-07-31 VITALS — DIASTOLIC BLOOD PRESSURE: 71 MMHG | SYSTOLIC BLOOD PRESSURE: 129 MMHG

## 2021-07-31 VITALS — DIASTOLIC BLOOD PRESSURE: 84 MMHG | SYSTOLIC BLOOD PRESSURE: 146 MMHG

## 2021-07-31 VITALS — SYSTOLIC BLOOD PRESSURE: 142 MMHG | DIASTOLIC BLOOD PRESSURE: 77 MMHG

## 2021-07-31 VITALS — SYSTOLIC BLOOD PRESSURE: 128 MMHG | DIASTOLIC BLOOD PRESSURE: 94 MMHG

## 2021-07-31 LAB
ANION GAP SERPL CALC-SCNC: 6 MMOL/L (ref 5–15)
BASOPHILS # BLD AUTO: 0 X10^3/UL (ref 0–0.1)
BASOPHILS NFR BLD AUTO: 0 % (ref 0–1)
CALCIUM SERPL-MCNC: 9 MG/DL (ref 8.5–10.1)
CHLORIDE SERPL-SCNC: 109 MMOL/L (ref 98–107)
CREAT SERPL-MCNC: 0.83 MG/DL (ref 0.55–1.02)
EOSINOPHIL # BLD AUTO: 0 X10^3/UL (ref 0–0.4)
EOSINOPHIL NFR BLD AUTO: 0 % (ref 1–7)
ERYTHROCYTE [DISTWIDTH] IN BLOOD BY AUTOMATED COUNT: 19.4 % (ref 9.6–15.2)
LYMPHOCYTES # BLD AUTO: 1.2 X10^3/UL (ref 1–3.4)
LYMPHOCYTES NFR BLD AUTO: 8 % (ref 22–44)
MCH RBC QN AUTO: 25.4 PG (ref 27–34.8)
MCHC RBC AUTO-ENTMCNC: 31.8 G/DL (ref 32.4–35.8)
MONOCYTES # BLD AUTO: 0.9 X10^3/UL (ref 0.2–0.8)
MONOCYTES NFR BLD AUTO: 6 % (ref 2–9)
NEUTROPHILS # BLD AUTO: 12.6 X10^3/UL (ref 1.8–6.8)
NEUTROPHILS NFR BLD AUTO: 85 % (ref 42–75)
PLATELET # BLD AUTO: 327 X10^3/UL (ref 130–400)
PMV BLD AUTO: 7.4 FL (ref 7.4–10.4)
RBC # BLD AUTO: 3.62 X10^6/UL (ref 3.82–5.3)

## 2021-07-31 RX ADMIN — ATORVASTATIN CALCIUM SCH MG: 20 TABLET, FILM COATED ORAL at 19:53

## 2021-07-31 RX ADMIN — AMIODARONE HYDROCHLORIDE SCH MG: 200 TABLET ORAL at 08:06

## 2021-07-31 RX ADMIN — RIVAROXABAN SCH MG: 20 TABLET, FILM COATED ORAL at 17:50

## 2021-07-31 RX ADMIN — AMPICILLIN SODIUM AND SULBACTAM SODIUM SCH MLS/HR: 2; 1 INJECTION, POWDER, FOR SOLUTION INTRAMUSCULAR; INTRAVENOUS at 14:58

## 2021-07-31 RX ADMIN — AMPICILLIN SODIUM AND SULBACTAM SODIUM SCH MLS/HR: 2; 1 INJECTION, POWDER, FOR SOLUTION INTRAMUSCULAR; INTRAVENOUS at 19:54

## 2021-07-31 RX ADMIN — KETOROLAC TROMETHAMINE PRN MG: 30 INJECTION, SOLUTION INTRAMUSCULAR at 15:02

## 2021-07-31 RX ADMIN — FERROUS SULFATE TAB 325 MG (65 MG ELEMENTAL FE) SCH MG: 325 (65 FE) TAB at 08:07

## 2021-07-31 RX ADMIN — AMIODARONE HYDROCHLORIDE SCH MG: 200 TABLET ORAL at 19:53

## 2021-07-31 RX ADMIN — AMPICILLIN SODIUM AND SULBACTAM SODIUM SCH MLS/HR: 2; 1 INJECTION, POWDER, FOR SOLUTION INTRAMUSCULAR; INTRAVENOUS at 08:07

## 2021-07-31 RX ADMIN — AMPICILLIN SODIUM AND SULBACTAM SODIUM SCH MLS/HR: 2; 1 INJECTION, POWDER, FOR SOLUTION INTRAMUSCULAR; INTRAVENOUS at 00:26

## 2021-08-01 VITALS — SYSTOLIC BLOOD PRESSURE: 129 MMHG | DIASTOLIC BLOOD PRESSURE: 77 MMHG

## 2021-08-01 VITALS — SYSTOLIC BLOOD PRESSURE: 115 MMHG | DIASTOLIC BLOOD PRESSURE: 72 MMHG

## 2021-08-01 VITALS — DIASTOLIC BLOOD PRESSURE: 69 MMHG | SYSTOLIC BLOOD PRESSURE: 123 MMHG

## 2021-08-01 VITALS — SYSTOLIC BLOOD PRESSURE: 119 MMHG | DIASTOLIC BLOOD PRESSURE: 71 MMHG

## 2021-08-01 RX ADMIN — AMPICILLIN SODIUM AND SULBACTAM SODIUM SCH MLS/HR: 2; 1 INJECTION, POWDER, FOR SOLUTION INTRAMUSCULAR; INTRAVENOUS at 07:54

## 2021-08-01 RX ADMIN — AMPICILLIN SODIUM AND SULBACTAM SODIUM SCH MLS/HR: 2; 1 INJECTION, POWDER, FOR SOLUTION INTRAMUSCULAR; INTRAVENOUS at 20:08

## 2021-08-01 RX ADMIN — AMIODARONE HYDROCHLORIDE SCH MG: 200 TABLET ORAL at 20:09

## 2021-08-01 RX ADMIN — FERROUS SULFATE TAB 325 MG (65 MG ELEMENTAL FE) SCH MG: 325 (65 FE) TAB at 07:55

## 2021-08-01 RX ADMIN — RIVAROXABAN SCH MG: 20 TABLET, FILM COATED ORAL at 16:45

## 2021-08-01 RX ADMIN — AMIODARONE HYDROCHLORIDE SCH MG: 200 TABLET ORAL at 07:55

## 2021-08-01 RX ADMIN — AMPICILLIN SODIUM AND SULBACTAM SODIUM SCH MLS/HR: 2; 1 INJECTION, POWDER, FOR SOLUTION INTRAMUSCULAR; INTRAVENOUS at 01:13

## 2021-08-01 RX ADMIN — AMPICILLIN SODIUM AND SULBACTAM SODIUM SCH MLS/HR: 2; 1 INJECTION, POWDER, FOR SOLUTION INTRAMUSCULAR; INTRAVENOUS at 13:04

## 2021-08-01 RX ADMIN — ATORVASTATIN CALCIUM SCH MG: 20 TABLET, FILM COATED ORAL at 20:08

## 2021-08-01 RX ADMIN — KETOROLAC TROMETHAMINE PRN MG: 30 INJECTION, SOLUTION INTRAMUSCULAR at 07:55

## 2021-08-02 VITALS — SYSTOLIC BLOOD PRESSURE: 147 MMHG | DIASTOLIC BLOOD PRESSURE: 70 MMHG

## 2021-08-02 VITALS — DIASTOLIC BLOOD PRESSURE: 61 MMHG | SYSTOLIC BLOOD PRESSURE: 128 MMHG

## 2021-08-02 VITALS — SYSTOLIC BLOOD PRESSURE: 151 MMHG | DIASTOLIC BLOOD PRESSURE: 74 MMHG

## 2021-08-02 VITALS — SYSTOLIC BLOOD PRESSURE: 117 MMHG | DIASTOLIC BLOOD PRESSURE: 64 MMHG

## 2021-08-02 LAB
ALBUMIN SERPL-MCNC: 1.9 G/DL (ref 3.4–5)
ALP SERPL-CCNC: 69 U/L (ref 45–117)
ALT SERPL-CCNC: 14 U/L (ref 12–78)
ANION GAP SERPL CALC-SCNC: 1 MMOL/L (ref 5–15)
BASOPHILS # BLD AUTO: 0 X10^3/UL (ref 0–0.1)
BASOPHILS NFR BLD AUTO: 0 % (ref 0–1)
BILIRUB SERPL-MCNC: 0.2 MG/DL (ref 0.2–1)
CALCIUM SERPL-MCNC: 9 MG/DL (ref 8.5–10.1)
CHLORIDE SERPL-SCNC: 115 MMOL/L (ref 98–107)
CREAT SERPL-MCNC: 0.73 MG/DL (ref 0.55–1.02)
CRP SERPL-MCNC: 2.4 MG/DL (ref 0.02–0.49)
EOSINOPHIL # BLD AUTO: 0.3 X10^3/UL (ref 0–0.4)
EOSINOPHIL NFR BLD AUTO: 3 % (ref 1–7)
ERYTHROCYTE [DISTWIDTH] IN BLOOD BY AUTOMATED COUNT: 20.2 % (ref 9.6–15.2)
ERYTHROCYTE [SEDIMENTATION RATE] IN BLOOD BY WESTERGREN METHOD: 93 MM/HR (ref 0–20)
HCT (SEDRATE): 26.8 % (ref 34.6–47.8)
LYMPHOCYTES # BLD AUTO: 2 X10^3/UL (ref 1–3.4)
LYMPHOCYTES NFR BLD AUTO: 18 % (ref 22–44)
MCH RBC QN AUTO: 26.2 PG (ref 27–34.8)
MCHC RBC AUTO-ENTMCNC: 32.8 G/DL (ref 32.4–35.8)
MONOCYTES # BLD AUTO: 0.9 X10^3/UL (ref 0.2–0.8)
MONOCYTES NFR BLD AUTO: 8 % (ref 2–9)
NEUTROPHILS # BLD AUTO: 7.7 X10^3/UL (ref 1.8–6.8)
NEUTROPHILS NFR BLD AUTO: 71 % (ref 42–75)
PLATELET # BLD AUTO: 311 X10^3/UL (ref 130–400)
PMV BLD AUTO: 7.7 FL (ref 7.4–10.4)
PROT SERPL-MCNC: 6 G/DL (ref 6.4–8.2)
RBC # BLD AUTO: 3.33 X10^6/UL (ref 3.82–5.3)

## 2021-08-02 PROCEDURE — 02HV33Z INSERTION OF INFUSION DEVICE INTO SUPERIOR VENA CAVA, PERCUTANEOUS APPROACH: ICD-10-PCS | Performed by: RADIOLOGY

## 2021-08-02 PROCEDURE — B548ZZA ULTRASONOGRAPHY OF SUPERIOR VENA CAVA, GUIDANCE: ICD-10-PCS | Performed by: RADIOLOGY

## 2021-08-02 PROCEDURE — B5181ZA FLUOROSCOPY OF SUPERIOR VENA CAVA USING LOW OSMOLAR CONTRAST, GUIDANCE: ICD-10-PCS | Performed by: RADIOLOGY

## 2021-08-02 RX ADMIN — ACETAMINOPHEN PRN MG: 325 TABLET, FILM COATED ORAL at 21:27

## 2021-08-02 RX ADMIN — AMPICILLIN SODIUM AND SULBACTAM SODIUM SCH MLS/HR: 2; 1 INJECTION, POWDER, FOR SOLUTION INTRAMUSCULAR; INTRAVENOUS at 07:19

## 2021-08-02 RX ADMIN — ACETAMINOPHEN PRN MG: 325 TABLET, FILM COATED ORAL at 07:30

## 2021-08-02 RX ADMIN — AMPICILLIN SODIUM AND SULBACTAM SODIUM SCH MLS/HR: 2; 1 INJECTION, POWDER, FOR SOLUTION INTRAMUSCULAR; INTRAVENOUS at 01:44

## 2021-08-02 RX ADMIN — AMPICILLIN SODIUM AND SULBACTAM SODIUM SCH MLS/HR: 2; 1 INJECTION, POWDER, FOR SOLUTION INTRAMUSCULAR; INTRAVENOUS at 13:08

## 2021-08-02 RX ADMIN — ATORVASTATIN CALCIUM SCH MG: 20 TABLET, FILM COATED ORAL at 21:17

## 2021-08-02 RX ADMIN — FERROUS SULFATE TAB 325 MG (65 MG ELEMENTAL FE) SCH MG: 325 (65 FE) TAB at 10:10

## 2021-08-02 RX ADMIN — RIVAROXABAN SCH MG: 20 TABLET, FILM COATED ORAL at 16:32

## 2021-08-02 RX ADMIN — AMIODARONE HYDROCHLORIDE SCH MG: 200 TABLET ORAL at 21:17

## 2021-08-02 RX ADMIN — AMPICILLIN SODIUM AND SULBACTAM SODIUM SCH MLS/HR: 2; 1 INJECTION, POWDER, FOR SOLUTION INTRAMUSCULAR; INTRAVENOUS at 21:17

## 2021-08-02 RX ADMIN — AMIODARONE HYDROCHLORIDE SCH MG: 200 TABLET ORAL at 10:10

## 2021-08-02 RX ADMIN — OXYCODONE HYDROCHLORIDE PRN MG: 5 TABLET ORAL at 21:26

## 2021-08-02 RX ADMIN — OXYCODONE HYDROCHLORIDE PRN MG: 5 TABLET ORAL at 07:30

## 2021-08-03 VITALS — DIASTOLIC BLOOD PRESSURE: 67 MMHG | SYSTOLIC BLOOD PRESSURE: 128 MMHG

## 2021-08-03 VITALS — SYSTOLIC BLOOD PRESSURE: 170 MMHG | DIASTOLIC BLOOD PRESSURE: 50 MMHG

## 2021-08-03 VITALS — DIASTOLIC BLOOD PRESSURE: 68 MMHG | SYSTOLIC BLOOD PRESSURE: 117 MMHG

## 2021-08-03 VITALS — SYSTOLIC BLOOD PRESSURE: 153 MMHG | DIASTOLIC BLOOD PRESSURE: 79 MMHG

## 2021-08-03 LAB
BASOPHILS # BLD AUTO: 0.1 X10^3/UL (ref 0–0.1)
BASOPHILS NFR BLD AUTO: 1 % (ref 0–1)
CLOSTRIDIUM DIFFICILE ANTIGEN: NEGATIVE
CLOSTRIDIUM DIFFICILE TOXIN: NEGATIVE
EOSINOPHIL # BLD AUTO: 0.3 X10^3/UL (ref 0–0.4)
EOSINOPHIL NFR BLD AUTO: 3 % (ref 1–7)
ERYTHROCYTE [DISTWIDTH] IN BLOOD BY AUTOMATED COUNT: 20 % (ref 9.6–15.2)
LYMPHOCYTES # BLD AUTO: 1.9 X10^3/UL (ref 1–3.4)
LYMPHOCYTES NFR BLD AUTO: 19 % (ref 22–44)
MCH RBC QN AUTO: 25.9 PG (ref 27–34.8)
MCHC RBC AUTO-ENTMCNC: 32.2 G/DL (ref 32.4–35.8)
MONOCYTES # BLD AUTO: 0.8 X10^3/UL (ref 0.2–0.8)
MONOCYTES NFR BLD AUTO: 8 % (ref 2–9)
NEUTROPHILS # BLD AUTO: 7.1 X10^3/UL (ref 1.8–6.8)
NEUTROPHILS NFR BLD AUTO: 70 % (ref 42–75)
PLATELET # BLD AUTO: 307 X10^3/UL (ref 130–400)
PMV BLD AUTO: 7.3 FL (ref 7.4–10.4)
RBC # BLD AUTO: 3.32 X10^6/UL (ref 3.82–5.3)

## 2021-08-03 RX ADMIN — AMPICILLIN SODIUM AND SULBACTAM SODIUM SCH MLS/HR: 2; 1 INJECTION, POWDER, FOR SOLUTION INTRAMUSCULAR; INTRAVENOUS at 08:01

## 2021-08-03 RX ADMIN — AMPICILLIN SODIUM AND SULBACTAM SODIUM SCH MLS/HR: 2; 1 INJECTION, POWDER, FOR SOLUTION INTRAMUSCULAR; INTRAVENOUS at 03:21

## 2021-08-03 RX ADMIN — ACETAMINOPHEN PRN MG: 325 TABLET, FILM COATED ORAL at 18:04

## 2021-08-03 RX ADMIN — AMIODARONE HYDROCHLORIDE SCH MG: 200 TABLET ORAL at 21:08

## 2021-08-03 RX ADMIN — OXYCODONE HYDROCHLORIDE PRN MG: 5 TABLET ORAL at 11:27

## 2021-08-03 RX ADMIN — AMIODARONE HYDROCHLORIDE SCH MG: 200 TABLET ORAL at 08:01

## 2021-08-03 RX ADMIN — ACETAMINOPHEN PRN MG: 325 TABLET, FILM COATED ORAL at 11:27

## 2021-08-03 RX ADMIN — DAPTOMYCIN SCH MLS/HR: 500 INJECTION, POWDER, LYOPHILIZED, FOR SOLUTION INTRAVENOUS at 13:07

## 2021-08-03 RX ADMIN — RIVAROXABAN SCH MG: 20 TABLET, FILM COATED ORAL at 17:56

## 2021-08-03 RX ADMIN — OXYCODONE HYDROCHLORIDE PRN MG: 5 TABLET ORAL at 18:07

## 2021-08-03 RX ADMIN — FERROUS SULFATE TAB 325 MG (65 MG ELEMENTAL FE) SCH MG: 325 (65 FE) TAB at 08:01

## 2021-08-03 RX ADMIN — HYDROCHLOROTHIAZIDE SCH MG: 25 TABLET ORAL at 11:30

## 2021-08-03 RX ADMIN — OXYCODONE HYDROCHLORIDE PRN MG: 5 TABLET ORAL at 18:04

## 2021-08-04 VITALS — DIASTOLIC BLOOD PRESSURE: 71 MMHG | SYSTOLIC BLOOD PRESSURE: 121 MMHG

## 2021-08-04 VITALS — DIASTOLIC BLOOD PRESSURE: 79 MMHG | SYSTOLIC BLOOD PRESSURE: 128 MMHG

## 2021-08-04 VITALS — DIASTOLIC BLOOD PRESSURE: 76 MMHG | SYSTOLIC BLOOD PRESSURE: 137 MMHG

## 2021-08-04 RX ADMIN — OXYCODONE HYDROCHLORIDE PRN MG: 5 TABLET ORAL at 14:09

## 2021-08-04 RX ADMIN — HYDROCHLOROTHIAZIDE SCH MG: 25 TABLET ORAL at 08:39

## 2021-08-04 RX ADMIN — FERROUS SULFATE TAB 325 MG (65 MG ELEMENTAL FE) SCH MG: 325 (65 FE) TAB at 08:39

## 2021-08-04 RX ADMIN — AMIODARONE HYDROCHLORIDE SCH MG: 200 TABLET ORAL at 08:39

## 2021-08-04 RX ADMIN — DAPTOMYCIN SCH MLS/HR: 500 INJECTION, POWDER, LYOPHILIZED, FOR SOLUTION INTRAVENOUS at 09:03

## 2021-08-04 RX ADMIN — OXYCODONE HYDROCHLORIDE PRN MG: 5 TABLET ORAL at 08:39

## 2021-08-24 PROBLEM — S82.841D: Status: ACTIVE | Noted: 2021-08-24

## 2021-09-20 PROBLEM — T84.621D: Status: ACTIVE | Noted: 2021-09-20

## (undated) DEVICE — PACK LOWER EXTREMITY - (2/CA)

## (undated) DEVICE — BLADE SURGICAL #15 - (50/BX 3BX/CA)

## (undated) DEVICE — GLOVE BIOGEL SZ 7.5 SURGICAL PF LTX - (50PR/BX 4BX/CA)

## (undated) DEVICE — CANISTER SUCTION 3000ML MECHANICAL FILTER AUTO SHUTOFF MEDI-VAC NONSTERILE LF DISP  (40EA/CA)

## (undated) DEVICE — KIT ANESTHESIA W/CIRCUIT & 3/LT BAG W/FILTER (20EA/CA)

## (undated) DEVICE — GLOVE BIOGEL INDICATOR SZ 8 SURGICAL PF LTX - (50/BX 4BX/CA)

## (undated) DEVICE — SUTURE 2-0 MONOCRYL CT-1

## (undated) DEVICE — HEAD HOLDER JUNIOR/ADULT

## (undated) DEVICE — LACTATED RINGERS INJ 1000 ML - (14EA/CA 60CA/PF)

## (undated) DEVICE — SET EXTENSION WITH 2 PORTS (48EA/CA) ***PART #2C8610 IS A SUBSTITUTE*****

## (undated) DEVICE — NEPTUNE 4 PORT MANIFOLD - (20/PK)

## (undated) DEVICE — PAD PREP 24 X 48 CUFFED - (100/CA)

## (undated) DEVICE — SPLINT PLASTER 5 IN X 30 IN - (50EA/BX 6BX/CA)

## (undated) DEVICE — SLEEVE, VASO, THIGH, MED

## (undated) DEVICE — MASK ANESTHESIA ADULT  - (100/CA)

## (undated) DEVICE — SET LEADWIRE 5 LEAD BEDSIDE DISPOSABLE ECG (1SET OF 5/EA)

## (undated) DEVICE — DRAPE C-ARM LARGE 41IN X 74 IN - (10/BX 2BX/CA)

## (undated) DEVICE — SUTURE GENERAL

## (undated) DEVICE — PROTECTOR ULNA NERVE - (36PR/CA)

## (undated) DEVICE — PAD LAP STERILE 18 X 18 - (5/PK 40PK/CA)

## (undated) DEVICE — NEEDLE NON SAFETY HYPO 22 GA X 1 1/2 IN (100/BX)

## (undated) DEVICE — ELECTRODE 850 FOAM ADHESIVE - HYDROGEL RADIOTRNSPRNT (50/PK)

## (undated) DEVICE — SENSOR SPO2 NEO LNCS ADHESIVE (20/BX) SEE USER NOTES

## (undated) DEVICE — TUBING CLEARLINK DUO-VENT - C-FLO (48EA/CA)

## (undated) DEVICE — BANDAGE ELASTIC STERILE MATRIX 6 X 10 (20EA/CA)

## (undated) DEVICE — DRAPE 36X28IN RAD CARM BND BG - (25/CA) O

## (undated) DEVICE — SUTURE 2-0 VICRYL PLUS CT-1 - 8 X 18 INCH(12/BX)

## (undated) DEVICE — PADDING CAST 6 IN STERILE - 6 X 4 YDS (24/CA)

## (undated) DEVICE — ELECTRODE DUAL RETURN W/ CORD - (50/PK)

## (undated) DEVICE — GOWN WARMING STANDARD FLEX - (30/CA)

## (undated) DEVICE — SODIUM CHL IRRIGATION 0.9% 1000ML (12EA/CA)

## (undated) DEVICE — SUCTION INSTRUMENT YANKAUER BULBOUS TIP W/O VENT (50EA/CA)

## (undated) DEVICE — WRAP COBAN SELF-ADHERENT 6 IN X  5YDS STERILE TAN (12/CA)

## (undated) DEVICE — BANDAGE ELASTIC 6 HONEYCOMB - 6X5YD LF (20/CA)"